# Patient Record
Sex: FEMALE | Race: BLACK OR AFRICAN AMERICAN | NOT HISPANIC OR LATINO | URBAN - METROPOLITAN AREA
[De-identification: names, ages, dates, MRNs, and addresses within clinical notes are randomized per-mention and may not be internally consistent; named-entity substitution may affect disease eponyms.]

---

## 2023-09-18 ENCOUNTER — INPATIENT (INPATIENT)
Facility: HOSPITAL | Age: 63
LOS: 4 days | Discharge: LEFT BEFORE DISCHARGE | DRG: 304 | End: 2023-09-23
Attending: INTERNAL MEDICINE | Admitting: INTERNAL MEDICINE
Payer: COMMERCIAL

## 2023-09-18 VITALS
DIASTOLIC BLOOD PRESSURE: 93 MMHG | WEIGHT: 110.01 LBS | OXYGEN SATURATION: 98 % | RESPIRATION RATE: 16 BRPM | TEMPERATURE: 98 F | HEART RATE: 80 BPM | SYSTOLIC BLOOD PRESSURE: 221 MMHG

## 2023-09-18 LAB
ALBUMIN SERPL ELPH-MCNC: 3.8 G/DL — SIGNIFICANT CHANGE UP (ref 3.4–5)
ALBUMIN SERPL ELPH-MCNC: 4.2 G/DL — SIGNIFICANT CHANGE UP (ref 3.3–5)
ALP SERPL-CCNC: 129 U/L — HIGH (ref 40–120)
ALP SERPL-CCNC: 136 U/L — HIGH (ref 40–120)
ALT FLD-CCNC: 18 U/L — SIGNIFICANT CHANGE UP (ref 10–45)
ALT FLD-CCNC: 26 U/L — SIGNIFICANT CHANGE UP (ref 12–42)
AMPHET UR-MCNC: NEGATIVE — SIGNIFICANT CHANGE UP
ANION GAP SERPL CALC-SCNC: 11 MMOL/L — SIGNIFICANT CHANGE UP (ref 9–16)
ANION GAP SERPL CALC-SCNC: 14 MMOL/L — SIGNIFICANT CHANGE UP (ref 5–17)
APPEARANCE CSF: CLEAR — SIGNIFICANT CHANGE UP
APPEARANCE SPUN FLD: COLORLESS — SIGNIFICANT CHANGE UP
APPEARANCE UR: CLEAR — SIGNIFICANT CHANGE UP
AST SERPL-CCNC: 22 U/L — SIGNIFICANT CHANGE UP (ref 10–40)
AST SERPL-CCNC: 27 U/L — SIGNIFICANT CHANGE UP (ref 15–37)
BACTERIA # UR AUTO: PRESENT /HPF
BARBITURATES UR SCN-MCNC: NEGATIVE — SIGNIFICANT CHANGE UP
BASOPHILS # BLD AUTO: 0.03 K/UL — SIGNIFICANT CHANGE UP (ref 0–0.2)
BASOPHILS # BLD AUTO: 0.04 K/UL — SIGNIFICANT CHANGE UP (ref 0–0.2)
BASOPHILS NFR BLD AUTO: 0.4 % — SIGNIFICANT CHANGE UP (ref 0–2)
BASOPHILS NFR BLD AUTO: 0.5 % — SIGNIFICANT CHANGE UP (ref 0–2)
BENZODIAZ UR-MCNC: NEGATIVE — SIGNIFICANT CHANGE UP
BILIRUB SERPL-MCNC: 0.6 MG/DL — SIGNIFICANT CHANGE UP (ref 0.2–1.2)
BILIRUB SERPL-MCNC: 0.7 MG/DL — SIGNIFICANT CHANGE UP (ref 0.2–1.2)
BILIRUB UR-MCNC: NEGATIVE — SIGNIFICANT CHANGE UP
BUN SERPL-MCNC: 9 MG/DL — SIGNIFICANT CHANGE UP (ref 7–23)
BUN SERPL-MCNC: 9 MG/DL — SIGNIFICANT CHANGE UP (ref 7–23)
CALCIUM SERPL-MCNC: 9.8 MG/DL — SIGNIFICANT CHANGE UP (ref 8.5–10.5)
CALCIUM SERPL-MCNC: 9.9 MG/DL — SIGNIFICANT CHANGE UP (ref 8.4–10.5)
CHLORIDE SERPL-SCNC: 96 MMOL/L — SIGNIFICANT CHANGE UP (ref 96–108)
CHLORIDE SERPL-SCNC: 96 MMOL/L — SIGNIFICANT CHANGE UP (ref 96–108)
CO2 SERPL-SCNC: 25 MMOL/L — SIGNIFICANT CHANGE UP (ref 22–31)
CO2 SERPL-SCNC: 29 MMOL/L — SIGNIFICANT CHANGE UP (ref 22–31)
COCAINE METAB.OTHER UR-MCNC: NEGATIVE — SIGNIFICANT CHANGE UP
COLOR CSF: SIGNIFICANT CHANGE UP
COLOR SPEC: YELLOW — SIGNIFICANT CHANGE UP
CREAT SERPL-MCNC: 0.53 MG/DL — SIGNIFICANT CHANGE UP (ref 0.5–1.3)
CREAT SERPL-MCNC: 0.69 MG/DL — SIGNIFICANT CHANGE UP (ref 0.5–1.3)
CSF COMMENTS: SIGNIFICANT CHANGE UP
DIFF PNL FLD: ABNORMAL
EGFR: 103 ML/MIN/1.73M2 — SIGNIFICANT CHANGE UP
EGFR: 97 ML/MIN/1.73M2 — SIGNIFICANT CHANGE UP
EOSINOPHIL # BLD AUTO: 0 K/UL — SIGNIFICANT CHANGE UP (ref 0–0.5)
EOSINOPHIL # BLD AUTO: 0.06 K/UL — SIGNIFICANT CHANGE UP (ref 0–0.5)
EOSINOPHIL NFR BLD AUTO: 0 % — SIGNIFICANT CHANGE UP (ref 0–6)
EOSINOPHIL NFR BLD AUTO: 0.8 % — SIGNIFICANT CHANGE UP (ref 0–6)
EPI CELLS # UR: ABNORMAL /HPF (ref 0–5)
GLUCOSE BLDC GLUCOMTR-MCNC: 116 MG/DL — HIGH (ref 70–99)
GLUCOSE CSF-MCNC: 75 MG/DL — HIGH (ref 40–70)
GLUCOSE SERPL-MCNC: 118 MG/DL — HIGH (ref 70–99)
GLUCOSE SERPL-MCNC: 128 MG/DL — HIGH (ref 70–99)
GLUCOSE UR QL: NEGATIVE — SIGNIFICANT CHANGE UP
GRAM STN FLD: SIGNIFICANT CHANGE UP
HCT VFR BLD CALC: 46.8 % — HIGH (ref 34.5–45)
HCT VFR BLD CALC: 47.1 % — HIGH (ref 34.5–45)
HGB BLD-MCNC: 15.1 G/DL — SIGNIFICANT CHANGE UP (ref 11.5–15.5)
HGB BLD-MCNC: 15.4 G/DL — SIGNIFICANT CHANGE UP (ref 11.5–15.5)
IMM GRANULOCYTES NFR BLD AUTO: 0.3 % — SIGNIFICANT CHANGE UP (ref 0–0.9)
IMM GRANULOCYTES NFR BLD AUTO: 0.4 % — SIGNIFICANT CHANGE UP (ref 0–0.9)
KETONES UR-MCNC: 15 MG/DL
LACTATE SERPL-SCNC: 1.5 MMOL/L — SIGNIFICANT CHANGE UP (ref 0.5–2)
LEUKOCYTE ESTERASE UR-ACNC: NEGATIVE — SIGNIFICANT CHANGE UP
LYMPHOCYTES # BLD AUTO: 1.16 K/UL — SIGNIFICANT CHANGE UP (ref 1–3.3)
LYMPHOCYTES # BLD AUTO: 1.37 K/UL — SIGNIFICANT CHANGE UP (ref 1–3.3)
LYMPHOCYTES # BLD AUTO: 15 % — SIGNIFICANT CHANGE UP (ref 13–44)
LYMPHOCYTES # BLD AUTO: 18.2 % — SIGNIFICANT CHANGE UP (ref 13–44)
LYMPHOCYTES # CSF: 1 % — LOW (ref 40–80)
MAGNESIUM SERPL-MCNC: 1.7 MG/DL — SIGNIFICANT CHANGE UP (ref 1.6–2.6)
MCHC RBC-ENTMCNC: 25.9 PG — LOW (ref 27–34)
MCHC RBC-ENTMCNC: 26.1 PG — LOW (ref 27–34)
MCHC RBC-ENTMCNC: 32.1 GM/DL — SIGNIFICANT CHANGE UP (ref 32–36)
MCHC RBC-ENTMCNC: 32.9 GM/DL — SIGNIFICANT CHANGE UP (ref 32–36)
MCV RBC AUTO: 79.2 FL — LOW (ref 80–100)
MCV RBC AUTO: 80.8 FL — SIGNIFICANT CHANGE UP (ref 80–100)
METHADONE UR-MCNC: NEGATIVE — SIGNIFICANT CHANGE UP
MONOCYTES # BLD AUTO: 0.44 K/UL — SIGNIFICANT CHANGE UP (ref 0–0.9)
MONOCYTES # BLD AUTO: 0.67 K/UL — SIGNIFICANT CHANGE UP (ref 0–0.9)
MONOCYTES NFR BLD AUTO: 5.7 % — SIGNIFICANT CHANGE UP (ref 2–14)
MONOCYTES NFR BLD AUTO: 8.9 % — SIGNIFICANT CHANGE UP (ref 2–14)
MONOS+MACROS NFR CSF: 1 % — LOW (ref 15–45)
NEUTROPHILS # BLD AUTO: 5.41 K/UL — SIGNIFICANT CHANGE UP (ref 1.8–7.4)
NEUTROPHILS # BLD AUTO: 6.01 K/UL — SIGNIFICANT CHANGE UP (ref 1.8–7.4)
NEUTROPHILS # CSF: 0 % — SIGNIFICANT CHANGE UP (ref 0–6)
NEUTROPHILS NFR BLD AUTO: 72.1 % — SIGNIFICANT CHANGE UP (ref 43–77)
NEUTROPHILS NFR BLD AUTO: 77.7 % — HIGH (ref 43–77)
NITRITE UR-MCNC: NEGATIVE — SIGNIFICANT CHANGE UP
NRBC # BLD: 0 /100 WBCS — SIGNIFICANT CHANGE UP (ref 0–0)
NRBC # BLD: 0 /100 WBCS — SIGNIFICANT CHANGE UP (ref 0–0)
NRBC NFR CSF: 2 /UL — SIGNIFICANT CHANGE UP (ref 0–5)
OPIATES UR-MCNC: NEGATIVE — SIGNIFICANT CHANGE UP
OSMOLALITY UR: 523 MOSM/KG — SIGNIFICANT CHANGE UP (ref 300–900)
PCP SPEC-MCNC: SIGNIFICANT CHANGE UP
PCP UR-MCNC: NEGATIVE — SIGNIFICANT CHANGE UP
PH UR: 6.5 — SIGNIFICANT CHANGE UP (ref 5–8)
PHOSPHATE SERPL-MCNC: 3 MG/DL — SIGNIFICANT CHANGE UP (ref 2.5–4.5)
PLATELET # BLD AUTO: 192 K/UL — SIGNIFICANT CHANGE UP (ref 150–400)
PLATELET # BLD AUTO: 212 K/UL — SIGNIFICANT CHANGE UP (ref 150–400)
POTASSIUM SERPL-MCNC: 3.5 MMOL/L — SIGNIFICANT CHANGE UP (ref 3.5–5.3)
POTASSIUM SERPL-MCNC: 3.7 MMOL/L — SIGNIFICANT CHANGE UP (ref 3.5–5.3)
POTASSIUM SERPL-SCNC: 3.5 MMOL/L — SIGNIFICANT CHANGE UP (ref 3.5–5.3)
POTASSIUM SERPL-SCNC: 3.7 MMOL/L — SIGNIFICANT CHANGE UP (ref 3.5–5.3)
PROCALCITONIN SERPL-MCNC: 0.06 NG/ML — SIGNIFICANT CHANGE UP (ref 0.02–0.1)
PROT CSF-MCNC: 79 MG/DL — HIGH (ref 15–45)
PROT SERPL-MCNC: 10.2 G/DL — HIGH (ref 6.4–8.2)
PROT SERPL-MCNC: 9.3 G/DL — HIGH (ref 6–8.3)
PROT UR-MCNC: >=300 MG/DL
RBC # BLD: 5.83 M/UL — HIGH (ref 3.8–5.2)
RBC # BLD: 5.91 M/UL — HIGH (ref 3.8–5.2)
RBC # CSF: 1 /UL — HIGH (ref 0–0)
RBC # FLD: 18.7 % — HIGH (ref 10.3–14.5)
RBC # FLD: 19 % — HIGH (ref 10.3–14.5)
RBC CASTS # UR COMP ASSIST: ABNORMAL /HPF
SODIUM SERPL-SCNC: 135 MMOL/L — SIGNIFICANT CHANGE UP (ref 135–145)
SODIUM SERPL-SCNC: 136 MMOL/L — SIGNIFICANT CHANGE UP (ref 132–145)
SODIUM UR-SCNC: 128 MMOL/L — SIGNIFICANT CHANGE UP
SP GR SPEC: 1.02 — SIGNIFICANT CHANGE UP (ref 1–1.03)
SPECIMEN SOURCE: SIGNIFICANT CHANGE UP
THC UR QL: NEGATIVE — SIGNIFICANT CHANGE UP
TROPONIN I, HIGH SENSITIVITY RESULT: 26.4 NG/L — SIGNIFICANT CHANGE UP
TUBE TYPE: SIGNIFICANT CHANGE UP
UROBILINOGEN FLD QL: 0.2 E.U./DL — SIGNIFICANT CHANGE UP
WBC # BLD: 7.51 K/UL — SIGNIFICANT CHANGE UP (ref 3.8–10.5)
WBC # BLD: 7.73 K/UL — SIGNIFICANT CHANGE UP (ref 3.8–10.5)
WBC # FLD AUTO: 7.51 K/UL — SIGNIFICANT CHANGE UP (ref 3.8–10.5)
WBC # FLD AUTO: 7.73 K/UL — SIGNIFICANT CHANGE UP (ref 3.8–10.5)
WBC UR QL: ABNORMAL /HPF

## 2023-09-18 PROCEDURE — 88305 TISSUE EXAM BY PATHOLOGIST: CPT | Mod: 26

## 2023-09-18 PROCEDURE — 88108 CYTOPATH CONCENTRATE TECH: CPT | Mod: 26

## 2023-09-18 PROCEDURE — 71045 X-RAY EXAM CHEST 1 VIEW: CPT | Mod: 26

## 2023-09-18 PROCEDURE — 31500 INSERT EMERGENCY AIRWAY: CPT | Mod: GC

## 2023-09-18 PROCEDURE — 62270 DX LMBR SPI PNXR: CPT | Mod: GC

## 2023-09-18 PROCEDURE — 99291 CRITICAL CARE FIRST HOUR: CPT | Mod: GC,25

## 2023-09-18 PROCEDURE — 70450 CT HEAD/BRAIN W/O DYE: CPT | Mod: 26

## 2023-09-18 PROCEDURE — 99291 CRITICAL CARE FIRST HOUR: CPT

## 2023-09-18 RX ORDER — DEXMEDETOMIDINE HYDROCHLORIDE IN 0.9% SODIUM CHLORIDE 4 UG/ML
0.5 INJECTION INTRAVENOUS
Qty: 400 | Refills: 0 | Status: DISCONTINUED | OUTPATIENT
Start: 2023-09-18 | End: 2023-09-19

## 2023-09-18 RX ORDER — VANCOMYCIN HCL 1 G
750 VIAL (EA) INTRAVENOUS EVERY 12 HOURS
Refills: 0 | Status: DISCONTINUED | OUTPATIENT
Start: 2023-09-18 | End: 2023-09-19

## 2023-09-18 RX ORDER — FENTANYL CITRATE 50 UG/ML
25 INJECTION INTRAVENOUS ONCE
Refills: 0 | Status: DISCONTINUED | OUTPATIENT
Start: 2023-09-18 | End: 2023-09-18

## 2023-09-18 RX ORDER — DEXMEDETOMIDINE HYDROCHLORIDE IN 0.9% SODIUM CHLORIDE 4 UG/ML
0.5 INJECTION INTRAVENOUS
Qty: 200 | Refills: 0 | Status: DISCONTINUED | OUTPATIENT
Start: 2023-09-18 | End: 2023-09-18

## 2023-09-18 RX ORDER — METOCLOPRAMIDE HCL 10 MG
5 TABLET ORAL ONCE
Refills: 0 | Status: COMPLETED | OUTPATIENT
Start: 2023-09-18 | End: 2023-09-18

## 2023-09-18 RX ORDER — ACETAMINOPHEN 500 MG
1000 TABLET ORAL ONCE
Refills: 0 | Status: COMPLETED | OUTPATIENT
Start: 2023-09-18 | End: 2023-09-18

## 2023-09-18 RX ORDER — PROPOFOL 10 MG/ML
50 INJECTION, EMULSION INTRAVENOUS ONCE
Refills: 0 | Status: DISCONTINUED | OUTPATIENT
Start: 2023-09-18 | End: 2023-09-18

## 2023-09-18 RX ORDER — FENTANYL CITRATE 50 UG/ML
0.5 INJECTION INTRAVENOUS
Qty: 5000 | Refills: 0 | Status: DISCONTINUED | OUTPATIENT
Start: 2023-09-18 | End: 2023-09-19

## 2023-09-18 RX ORDER — CEFTRIAXONE 500 MG/1
2000 INJECTION, POWDER, FOR SOLUTION INTRAMUSCULAR; INTRAVENOUS EVERY 12 HOURS
Refills: 0 | Status: DISCONTINUED | OUTPATIENT
Start: 2023-09-18 | End: 2023-09-22

## 2023-09-18 RX ORDER — NICARDIPINE HYDROCHLORIDE 30 MG/1
5 CAPSULE, EXTENDED RELEASE ORAL
Qty: 40 | Refills: 0 | Status: DISCONTINUED | OUTPATIENT
Start: 2023-09-18 | End: 2023-09-19

## 2023-09-18 RX ORDER — CHLORHEXIDINE GLUCONATE 213 G/1000ML
15 SOLUTION TOPICAL EVERY 12 HOURS
Refills: 0 | Status: DISCONTINUED | OUTPATIENT
Start: 2023-09-18 | End: 2023-09-19

## 2023-09-18 RX ORDER — THIAMINE MONONITRATE (VIT B1) 100 MG
500 TABLET ORAL EVERY 8 HOURS
Refills: 0 | Status: COMPLETED | OUTPATIENT
Start: 2023-09-18 | End: 2023-09-21

## 2023-09-18 RX ORDER — ACYCLOVIR SODIUM 500 MG
500 VIAL (EA) INTRAVENOUS ONCE
Refills: 0 | Status: DISCONTINUED | OUTPATIENT
Start: 2023-09-18 | End: 2023-09-18

## 2023-09-18 RX ORDER — NICARDIPINE HYDROCHLORIDE 30 MG/1
5 CAPSULE, EXTENDED RELEASE ORAL
Qty: 40 | Refills: 0 | Status: DISCONTINUED | OUTPATIENT
Start: 2023-09-18 | End: 2023-09-18

## 2023-09-18 RX ORDER — MIDAZOLAM HYDROCHLORIDE 1 MG/ML
0.02 INJECTION, SOLUTION INTRAMUSCULAR; INTRAVENOUS
Qty: 100 | Refills: 0 | Status: DISCONTINUED | OUTPATIENT
Start: 2023-09-18 | End: 2023-09-19

## 2023-09-18 RX ORDER — ACYCLOVIR SODIUM 500 MG
500 VIAL (EA) INTRAVENOUS EVERY 8 HOURS
Refills: 0 | Status: DISCONTINUED | OUTPATIENT
Start: 2023-09-18 | End: 2023-09-20

## 2023-09-18 RX ORDER — SODIUM CHLORIDE 9 MG/ML
1000 INJECTION, SOLUTION INTRAVENOUS
Refills: 0 | Status: DISCONTINUED | OUTPATIENT
Start: 2023-09-18 | End: 2023-09-19

## 2023-09-18 RX ORDER — ENOXAPARIN SODIUM 100 MG/ML
30 INJECTION SUBCUTANEOUS EVERY 24 HOURS
Refills: 0 | Status: DISCONTINUED | OUTPATIENT
Start: 2023-09-19 | End: 2023-09-23

## 2023-09-18 RX ORDER — DEXAMETHASONE 0.5 MG/5ML
7.5 ELIXIR ORAL EVERY 6 HOURS
Refills: 0 | Status: DISCONTINUED | OUTPATIENT
Start: 2023-09-18 | End: 2023-09-19

## 2023-09-18 RX ORDER — ETOMIDATE 2 MG/ML
20 INJECTION INTRAVENOUS ONCE
Refills: 0 | Status: COMPLETED | OUTPATIENT
Start: 2023-09-18 | End: 2023-09-18

## 2023-09-18 RX ORDER — MIDAZOLAM HYDROCHLORIDE 1 MG/ML
2 INJECTION, SOLUTION INTRAMUSCULAR; INTRAVENOUS ONCE
Refills: 0 | Status: DISCONTINUED | OUTPATIENT
Start: 2023-09-18 | End: 2023-09-18

## 2023-09-18 RX ORDER — NOREPINEPHRINE BITARTRATE/D5W 8 MG/250ML
0.05 PLASTIC BAG, INJECTION (ML) INTRAVENOUS
Qty: 8 | Refills: 0 | Status: DISCONTINUED | OUTPATIENT
Start: 2023-09-18 | End: 2023-09-19

## 2023-09-18 RX ORDER — AMPICILLIN TRIHYDRATE 250 MG
2 CAPSULE ORAL EVERY 4 HOURS
Refills: 0 | Status: DISCONTINUED | OUTPATIENT
Start: 2023-09-18 | End: 2023-09-19

## 2023-09-18 RX ORDER — ACYCLOVIR SODIUM 500 MG
VIAL (EA) INTRAVENOUS
Refills: 0 | Status: DISCONTINUED | OUTPATIENT
Start: 2023-09-18 | End: 2023-09-18

## 2023-09-18 RX ORDER — LABETALOL HCL 100 MG
10 TABLET ORAL ONCE
Refills: 0 | Status: COMPLETED | OUTPATIENT
Start: 2023-09-18 | End: 2023-09-18

## 2023-09-18 RX ADMIN — MIDAZOLAM HYDROCHLORIDE 1 MG/KG/HR: 1 INJECTION, SOLUTION INTRAMUSCULAR; INTRAVENOUS at 20:18

## 2023-09-18 RX ADMIN — MIDAZOLAM HYDROCHLORIDE 2 MILLIGRAM(S): 1 INJECTION, SOLUTION INTRAMUSCULAR; INTRAVENOUS at 20:00

## 2023-09-18 RX ADMIN — Medication 2 MILLIGRAM(S): at 20:19

## 2023-09-18 RX ADMIN — FENTANYL CITRATE 25 MICROGRAM(S): 50 INJECTION INTRAVENOUS at 21:00

## 2023-09-18 RX ADMIN — DEXMEDETOMIDINE HYDROCHLORIDE IN 0.9% SODIUM CHLORIDE 6.24 MICROGRAM(S)/KG/HR: 4 INJECTION INTRAVENOUS at 19:01

## 2023-09-18 RX ADMIN — Medication 400 MILLIGRAM(S): at 19:02

## 2023-09-18 RX ADMIN — NICARDIPINE HYDROCHLORIDE 25 MG/HR: 30 CAPSULE, EXTENDED RELEASE ORAL at 17:26

## 2023-09-18 RX ADMIN — Medication 102 MILLIGRAM(S): at 13:32

## 2023-09-18 RX ADMIN — Medication 10 MILLIGRAM(S): at 13:31

## 2023-09-18 RX ADMIN — FENTANYL CITRATE 25 MICROGRAM(S): 50 INJECTION INTRAVENOUS at 20:00

## 2023-09-18 RX ADMIN — ETOMIDATE 20 MILLIGRAM(S): 2 INJECTION INTRAVENOUS at 19:59

## 2023-09-18 RX ADMIN — Medication 1000 MILLIGRAM(S): at 20:47

## 2023-09-18 RX ADMIN — NICARDIPINE HYDROCHLORIDE 25 MG/HR: 30 CAPSULE, EXTENDED RELEASE ORAL at 13:53

## 2023-09-18 RX ADMIN — Medication 216 GRAM(S): at 23:51

## 2023-09-18 NOTE — AIRWAY PLACEMENT NOTE ADULT - POST AIRWAY PLACEMENT ASSESSMENT:
breath sounds bilateral/positive end tidal CO2 noted/chest excursion noted breath sounds bilateral/breath sounds equal/positive end tidal CO2 noted/CXR pending/chest excursion noted

## 2023-09-18 NOTE — H&P ADULT - NSHPPHYSICALEXAM_GEN_ALL_CORE
Pt refuses to participate or allow physicians to touch her  General: Agitated w/ no acute distress  Neuro: AOx2-3, able to converse with staff, but agitated and appears alter  Pulm: Saturating well on RA w/ no increased WOB  CVS:   MSK:

## 2023-09-18 NOTE — PROGRESS NOTE ADULT - ASSESSMENT
Neuro  #AMS 2/2 Hypertensive emergency vs Pres  -Refuses to participate in exam or POCUS  -See CVS  -Send UTox to assess for drug induced enephalopathy    #HA  - Trauma to the head 3 days prior w/o changes on CT imaging.  -Tx w/ Tylenol PRN    CVS  #Hypertensive emergency  Treated w/ 2 runs of Nicardipine drip 25mL/hr, 1 10mg Labetalol IVP, goal of 20% reduction  -MRI to r/o PRES - if confirmed will target lower BP goals.  -No EKG changes      Pulm  SHRADDHA    Renal  No MARLENE    Endo  -A1c and Lipid panel to evaluate chronicity of     Prophylactic  Lovenox

## 2023-09-18 NOTE — PROGRESS NOTE ADULT - SUBJECTIVE AND OBJECTIVE BOX
Patient reports gradual onset of headache that became severe within about an hour. Headache is right-sided, throbbing, 10/10.  Positive photophobia.  Patient patient reports that she tripped and hit the right side of her head on a doorknob 3 days ago.  No fever, neck stiffness, chest pain, shortness of breath, abdominal pain, nausea, vomiting, diarrhea, focal weakness, paresthesias.  No history of hypertension or headaches.  Does not take any medications. Attempted to contact brother in the chart but had no significant additional input other than noting that the two live together. Not aware of any PCP, any PMHx, any FMHx, or medications taken.    ED course:  T /106 HR 84 RR % RA  Labetalol 10mg IVP  Metoclopramide 5mg IVP  2 runs of Nicardipine 40mg @ 25mL/hr    PE  General: Agitated w/ no acute distress  Neuro: AOx2-3, able to converse with staff, but agitated and appears alter  Pulm: Saturating well on RA w/ no increased WOB  CVS:   MSK:      Pt refuses to participate in exam

## 2023-09-18 NOTE — ED ADULT TRIAGE NOTE - CHIEF COMPLAINT QUOTE
Pt c/o headache , lethargy and weakness , reports hitting head 2 days ago.  On arrival pt hypertensive  STROKE

## 2023-09-18 NOTE — ED ADULT NURSE NOTE - OBJECTIVE STATEMENT
pt presents as above, hit head on door knob, 2 days ago, hurt afterwards, no pmhx, no regular meds, lives with brother, today went to work and colleagues noticed pt had clothes on round the wrong way, o/a to er pt taken to ct scan immediately, agitated on ct bed but redirectable. when taken to room pt unable to stay still stating "my head hurts" pointing to right side of head, denies nausea or photophobia , P3earl , answering all questions appropriately, asked Dr Owens to review pt as she appears so uncomfortable pt presents as above, hit head on door knob, 2 days ago, in pain since, no pmhx, no regular meds, lives with brother, today went to work and colleagues noticed pt had clothes on round the wrong way round, o/a to er pt taken to ct scan immediately, agitated on ct bed but redirectable. when taken to room pt unable to stay still stating "my head hurts" pointing to right side of head, begging for help denies nausea or photophobia , P3earl , answering all questions appropriately, but unable to fully obey commands in order to perform initial NIH scale, pt writhing around in bed clutching head, right foreahead.   asked Dr Owens to review pt as she appears so uncomfortable

## 2023-09-18 NOTE — H&P ADULT - CRITICAL CARE ATTENDING COMMENT
Hypertensive emergency with encephalopathy and fevers. No clinical history available so will attempt to get collateral. Possibility of PRES as well but need to know further history before targeting a lower SBP. CTH negative. Goal SBP of 180s. Needs an LP to rule out encephalitis/meningitis. Cover empirically with meningeal antibiotics. Rest of plan as per above.

## 2023-09-18 NOTE — H&P ADULT - NSHPLABSRESULTS_GEN_ALL_CORE
.  LABS:                         15.4   7.51  )-----------( 192      ( 18 Sep 2023 18:14 )             46.8         135  |  96  |  9   ----------------------------<  118<H>  3.7   |  25  |  0.53    Ca    9.9      18 Sep 2023 18:14  Phos  3.0       Mg     1.7         TPro  9.3<H>  /  Alb  4.2  /  TBili  0.7  /  DBili  x   /  AST  22  /  ALT  18  /  AlkPhos  129<H>        Urinalysis Basic - ( 18 Sep 2023 18:32 )    Color: Yellow / Appearance: Clear / S.025 / pH: x  Gluc: x / Ketone: 15 mg/dL  / Bili: Negative / Urobili: 0.2 E.U./dL   Blood: x / Protein: >=300 mg/dL / Nitrite: NEGATIVE   Leuk Esterase: NEGATIVE / RBC: 5-10 /HPF / WBC 5-10 /HPF   Sq Epi: x / Non Sq Epi: x / Bacteria: Present /HPF            Lactate, Blood: 1.5 mmol/L ( @ 18:14)      RADIOLOGY, EKG & ADDITIONAL TESTS: Reviewed.

## 2023-09-18 NOTE — ED PROVIDER NOTE - PROGRESS NOTE DETAILS
No response to labetalol. Put on nicardipine gtt. Spoke with Dr. Angel, admission to MICU under Dr. Barnes.

## 2023-09-18 NOTE — ED ADULT NURSE NOTE - NS ED NURSE LEVEL OF CONSCIOUSNESS SPEECH
Speaking Coherently
Detail Level: Zone
Photo Preface (Leave Blank If You Do Not Want): Photographs were obtained today

## 2023-09-18 NOTE — CHART NOTE - NSCHARTNOTEFT_GEN_A_CORE
attempted LP given fevers, ams. Patient given precedex at 1.5 and ativan 4 mg but continued moving during procedure. Not safe to continue LP procedure given difficulty of patient not cooperating so will defer for now. attempted LP given fevers, ams. Patient given precedex at 1.5 and ativan 4 mg but continued moving during procedure. Not safe to continue LP procedure given difficulty of patient not cooperating. Discussed with the patients brother/emergency contact who was agreeable with elective intubation to allow for appropriate diagnostic procedures given critical nature of making diagnosis of meningitis and PRES with LP and MRI respectively

## 2023-09-18 NOTE — ED PROVIDER NOTE - OBJECTIVE STATEMENT
Patient reports gradual onset of headache that became severe within about an hour. Headache is right-sided, throbbing, 10/10.  Positive photophobia.  Patient patient reports that she tripped and hit the right side of her head on a doorknob 3 days ago.  No fever, neck stiffness, chest pain, shortness of breath, abdominal pain, nausea, vomiting, diarrhea, focal weakness, paresthesias.  No history of hypertension or headaches.  Does not take any medications.

## 2023-09-18 NOTE — H&P ADULT - HISTORY OF PRESENT ILLNESS
Patient reports gradual onset of headache that became severe within about an hour. Headache is right-sided, throbbing, 10/10.  Positive photophobia.  Patient patient reports that she tripped and hit the right side of her head on a doorknob 3 days ago.  No fever, neck stiffness, chest pain, shortness of breath, abdominal pain, nausea, vomiting, diarrhea, focal weakness, paresthesias.  No history of hypertension or headaches.  Does not take any medications. Attempted to contact brother in the chart but had no significant additional input other than noting that the two live together. Not aware of any PCP, any PMHx, any FMHx, or medications taken.    ED course:  T /106 HR 84 RR % RA  Labetalol 10mg IVP  Metoclopramide 5mg IVP  2 runs of Nicardipine 40mg @ 25mL/hr    PE  General: Agitated w/ no acute distress  Neuro: AOx2-3, able to converse with staff, but agitated and appears alter  Pulm: Saturating well on RA w/ no increased WOB  CVS: No obvious heave  MSK: No obvious pedal edema

## 2023-09-18 NOTE — PROCEDURE NOTE - NSSITEPREP_SKIN_A_CORE
chlorhexidine/povidone iodine (if allergic to chlorhexidine) chlorhexidine/povidone iodine (if allergic to chlorhexidine)/Adherence to aseptic technique: hand hygiene prior to donning barriers (gown, gloves), don cap and mask, sterile drape over patient

## 2023-09-18 NOTE — H&P ADULT - ASSESSMENT
62F presents w/ AMS, elevated /106 w/ HA, and blurry vision likely 2/2 to HTN emergency currently improving on Nicardipine drip but continues to be altered.    Neuro  #AMS 2/2 Hypertensive emergency vs Pres  -Refuses to participate in exam or POCUS  -See CVS  -Send UTox to assess for drug induced enephalopathy    #HA  - Trauma to the head 3 days prior w/o changes on CT imaging.  -Tx w/ Tylenol PRN    CVS  #Hypertensive emergency  Treated w/ 2 runs of Nicardipine drip 25mL/hr, 1 10mg Labetalol IVP, goal of 20% reduction  -MRI to r/o PRES - if confirmed will target lower BP goals.  -No EKG changes      Pulm  SHRADDHA    Renal  No MARLENE    Endo  -A1c and Lipid panel to evaluate chronicity of     Prophylactic  Lovenox      62F presents w/ AMS, elevated /106 w/ HA, and blurry vision likely 2/2 to HTN emergency currently improving on Nicardipine drip but continues to be altered.    Neuro  #AMS 2/2 Hypertensive emergency vs Pres  -Refuses to participate in exam or POCUS  -See CVS  -Send UTox to assess for drug induced encephalopathy  -Sedation and intubation with plan for lumbar puncture with empiric coverage (vanc, ceftriaxone, ampicillin, acyclovir)    #HA  - Trauma to the head 3 days prior w/o changes on CT imaging.  -Tx w/ Tylenol PRN  - Repeat CT head with acute change in mental status    CVS  #Hypertensive emergency  Treated w/ 2 runs of Nicardipine drip 25mL/hr, 1 10mg Labetalol IVP, goal of 20% reduction  -MRI to r/o PRES - if confirmed will target lower BP goals.  -No EKG changes    Pulm  SHRADDHA    Renal  No MARLENE    Endo  -A1c and Lipid panel to evaluate chronicity of       Prophylactic  Fluids: None  Electrolytes: Replete to keep K>4 and Mg>2  Nutrition: NPO  DVT ppx: Lovenox 40mg daily  Dispo: RMF 62F presents w/ AMS, elevated /106 w/ HA, and blurry vision likely 2/2 to HTN emergency currently improving on Nicardipine drip but continues to be altered.    Neuro  #AMS 2/2 Hypertensive emergency vs Pres  -Refuses to participate in exam or POCUS  -See CVS  -Send UTox to assess for drug induced encephalopathy  -Sedation and intubation with plan for lumbar puncture with empiric coverage (vanc, ceftriaxone, ampicillin, acyclovir)    #HA  - Trauma to the head 3 days prior w/o changes on CT imaging.  -Tx w/ Tylenol PRN  - Repeat CT head with acute change in mental status    CVS  #Hypertensive emergency  Treated w/ 2 runs of Nicardipine drip 25mL/hr, 1 10mg Labetalol IVP, goal of 20% reduction. Proteinuria in UA  -MRI to r/o PRES - if confirmed will target lower BP goals.  -No EKG changes  - titrate nicardipine gtt    Pulm  Intubated and sedated  - follow up CXR for ET tube placement  - SAT/SBT    Renal  No MARLENE    ID  #Sepsis  2/4 SIRS criteria (febrile, tachycardic) with possible meningitis source  - follow up CXR, UA  - Plan for LP, will give vanco, ceftriaxaone, ampicillin, and acyclovir  - LR @ 80cc/hr while receiving acylocvir  - f/u blood/urine and CSF cultures  - Tylenol PRN    Heme  SHRADDHA    Endo  -A1c and Lipid panel to evaluate chronicity of hypertension    Prophylactic  Fluids: None  Electrolytes: Replete to keep K>4 and Mg>2  Nutrition: NPO  DVT ppx: Lovenox 40mg daily  Dispo: RMF 62F presents w/ AMS, elevated /106 w/ HA, and blurry vision likely 2/2 to HTN emergency currently improving on Nicardipine drip but continues to be altered.    Neuro  #AMS 2/2 Hypertensive emergency vs Pres  -Refuses to participate in exam or POCUS  -See CVS  -Send UTox to assess for drug induced encephalopathy  -Sedation and intubation with plan for lumbar puncture with empiric coverage (vanc, ceftriaxone, ampicillin, acyclovir)    #HA  - Trauma to the head 3 days prior w/o changes on CT imaging.  -Tx w/ Tylenol PRN  - Repeat CT head with acute change in mental status    CVS  #Hypertensive emergency  Treated w/ 2 runs of Nicardipine drip 25mL/hr, 1 10mg Labetalol IVP, goal of 20% reduction. Proteinuria in UA  -MRI to r/o PRES - if confirmed will target lower BP goals.  -No EKG changes  - titrate nicardipine gtt    Pulm  Intubated and sedated  - follow up CXR for ET tube placement  - SAT/SBT    Renal  No MARLENE    ID  #Sepsis  2/4 SIRS criteria (febrile, tachycardic) with possible meningitis source  - follow up CXR, UA  - Plan for LP, will give vanco 750mg q8h, ceftriaxaone 2g q12h, ampicillin 2g q4h, and acyclovir 500mg q8h  - LR @ 80cc/hr while receiving acylocvir  - f/u blood/urine and CSF cultures  - Tylenol PRN    Heme  SHRADDHA    Endo  -A1c and Lipid panel to evaluate chronicity of hypertension    Prophylactic  Fluids: None  Electrolytes: Replete to keep K>4 and Mg>2  Nutrition: NPO  DVT ppx: Lovenox 40mg daily  Dispo: RMF

## 2023-09-19 LAB
ALBUMIN SERPL ELPH-MCNC: 3.3 G/DL — SIGNIFICANT CHANGE UP (ref 3.3–5)
ALP SERPL-CCNC: 101 U/L — SIGNIFICANT CHANGE UP (ref 40–120)
ALT FLD-CCNC: 13 U/L — SIGNIFICANT CHANGE UP (ref 10–45)
AMMONIA BLD-MCNC: 25 UMOL/L — SIGNIFICANT CHANGE UP (ref 11–55)
ANION GAP SERPL CALC-SCNC: 12 MMOL/L — SIGNIFICANT CHANGE UP (ref 5–17)
AST SERPL-CCNC: 18 U/L — SIGNIFICANT CHANGE UP (ref 10–40)
BASOPHILS # BLD AUTO: 0.05 K/UL — SIGNIFICANT CHANGE UP (ref 0–0.2)
BASOPHILS NFR BLD AUTO: 0.8 % — SIGNIFICANT CHANGE UP (ref 0–2)
BILIRUB SERPL-MCNC: 0.7 MG/DL — SIGNIFICANT CHANGE UP (ref 0.2–1.2)
BUN SERPL-MCNC: 8 MG/DL — SIGNIFICANT CHANGE UP (ref 7–23)
CALCIUM SERPL-MCNC: 8.5 MG/DL — SIGNIFICANT CHANGE UP (ref 8.4–10.5)
CHLORIDE SERPL-SCNC: 97 MMOL/L — SIGNIFICANT CHANGE UP (ref 96–108)
CHOLEST SERPL-MCNC: 210 MG/DL — HIGH
CO2 SERPL-SCNC: 25 MMOL/L — SIGNIFICANT CHANGE UP (ref 22–31)
CREAT SERPL-MCNC: 0.81 MG/DL — SIGNIFICANT CHANGE UP (ref 0.5–1.3)
CSF PCR RESULT: SIGNIFICANT CHANGE UP
EGFR: 81 ML/MIN/1.73M2 — SIGNIFICANT CHANGE UP
EOSINOPHIL # BLD AUTO: 0.02 K/UL — SIGNIFICANT CHANGE UP (ref 0–0.5)
EOSINOPHIL NFR BLD AUTO: 0.3 % — SIGNIFICANT CHANGE UP (ref 0–6)
GLUCOSE SERPL-MCNC: 127 MG/DL — HIGH (ref 70–99)
HCT VFR BLD CALC: 39.7 % — SIGNIFICANT CHANGE UP (ref 34.5–45)
HCT VFR BLD CALC: 42.9 % — SIGNIFICANT CHANGE UP (ref 34.5–45)
HCV AB S/CO SERPL IA: 0.04 S/CO — SIGNIFICANT CHANGE UP
HCV AB SERPL-IMP: SIGNIFICANT CHANGE UP
HDLC SERPL-MCNC: 92 MG/DL — SIGNIFICANT CHANGE UP
HGB BLD-MCNC: 12.8 G/DL — SIGNIFICANT CHANGE UP (ref 11.5–15.5)
HGB BLD-MCNC: 13.9 G/DL — SIGNIFICANT CHANGE UP (ref 11.5–15.5)
IMM GRANULOCYTES NFR BLD AUTO: 0.2 % — SIGNIFICANT CHANGE UP (ref 0–0.9)
LACTATE SERPL-SCNC: 1.1 MMOL/L — SIGNIFICANT CHANGE UP (ref 0.5–2)
LIPID PNL WITH DIRECT LDL SERPL: 88 MG/DL — SIGNIFICANT CHANGE UP
LYMPHOCYTES # BLD AUTO: 2.02 K/UL — SIGNIFICANT CHANGE UP (ref 1–3.3)
LYMPHOCYTES # BLD AUTO: 32.3 % — SIGNIFICANT CHANGE UP (ref 13–44)
MAGNESIUM SERPL-MCNC: 1.5 MG/DL — LOW (ref 1.6–2.6)
MCHC RBC-ENTMCNC: 25.8 PG — LOW (ref 27–34)
MCHC RBC-ENTMCNC: 25.9 PG — LOW (ref 27–34)
MCHC RBC-ENTMCNC: 32.2 GM/DL — SIGNIFICANT CHANGE UP (ref 32–36)
MCHC RBC-ENTMCNC: 32.4 GM/DL — SIGNIFICANT CHANGE UP (ref 32–36)
MCV RBC AUTO: 79.9 FL — LOW (ref 80–100)
MCV RBC AUTO: 79.9 FL — LOW (ref 80–100)
MONOCYTES # BLD AUTO: 0.83 K/UL — SIGNIFICANT CHANGE UP (ref 0–0.9)
MONOCYTES NFR BLD AUTO: 13.3 % — SIGNIFICANT CHANGE UP (ref 2–14)
NEUTROPHILS # BLD AUTO: 3.32 K/UL — SIGNIFICANT CHANGE UP (ref 1.8–7.4)
NEUTROPHILS NFR BLD AUTO: 53.1 % — SIGNIFICANT CHANGE UP (ref 43–77)
NIGHT BLUE STAIN TISS: SIGNIFICANT CHANGE UP
NON HDL CHOLESTEROL: 118 MG/DL — SIGNIFICANT CHANGE UP
NRBC # BLD: 0 /100 WBCS — SIGNIFICANT CHANGE UP (ref 0–0)
NRBC # BLD: 0 /100 WBCS — SIGNIFICANT CHANGE UP (ref 0–0)
PHOSPHATE SERPL-MCNC: 3.6 MG/DL — SIGNIFICANT CHANGE UP (ref 2.5–4.5)
PLATELET # BLD AUTO: 190 K/UL — SIGNIFICANT CHANGE UP (ref 150–400)
PLATELET # BLD AUTO: 195 K/UL — SIGNIFICANT CHANGE UP (ref 150–400)
POTASSIUM SERPL-MCNC: 3.1 MMOL/L — LOW (ref 3.5–5.3)
POTASSIUM SERPL-SCNC: 3.1 MMOL/L — LOW (ref 3.5–5.3)
PROT SERPL-MCNC: 7.6 G/DL — SIGNIFICANT CHANGE UP (ref 6–8.3)
RBC # BLD: 4.97 M/UL — SIGNIFICANT CHANGE UP (ref 3.8–5.2)
RBC # BLD: 5.37 M/UL — HIGH (ref 3.8–5.2)
RBC # FLD: 17.7 % — HIGH (ref 10.3–14.5)
RBC # FLD: 18 % — HIGH (ref 10.3–14.5)
SARS-COV-2 RNA SPEC QL NAA+PROBE: SIGNIFICANT CHANGE UP
SODIUM SERPL-SCNC: 134 MMOL/L — LOW (ref 135–145)
SPECIMEN SOURCE: SIGNIFICANT CHANGE UP
TRIGL SERPL-MCNC: 150 MG/DL — HIGH
VANCOMYCIN TROUGH SERPL-MCNC: 5.7 UG/ML — LOW (ref 10–20)
WBC # BLD: 6.25 K/UL — SIGNIFICANT CHANGE UP (ref 3.8–10.5)
WBC # BLD: 7.68 K/UL — SIGNIFICANT CHANGE UP (ref 3.8–10.5)
WBC # FLD AUTO: 6.25 K/UL — SIGNIFICANT CHANGE UP (ref 3.8–10.5)
WBC # FLD AUTO: 7.68 K/UL — SIGNIFICANT CHANGE UP (ref 3.8–10.5)

## 2023-09-19 PROCEDURE — 99254 IP/OBS CNSLTJ NEW/EST MOD 60: CPT

## 2023-09-19 PROCEDURE — 36000 PLACE NEEDLE IN VEIN: CPT

## 2023-09-19 PROCEDURE — 76937 US GUIDE VASCULAR ACCESS: CPT | Mod: 26

## 2023-09-19 PROCEDURE — 99291 CRITICAL CARE FIRST HOUR: CPT

## 2023-09-19 RX ORDER — PROPOFOL 10 MG/ML
25.02 INJECTION, EMULSION INTRAVENOUS
Qty: 1000 | Refills: 0 | Status: DISCONTINUED | OUTPATIENT
Start: 2023-09-19 | End: 2023-09-20

## 2023-09-19 RX ORDER — NOREPINEPHRINE BITARTRATE/D5W 8 MG/250ML
0.05 PLASTIC BAG, INJECTION (ML) INTRAVENOUS
Qty: 8 | Refills: 0 | Status: DISCONTINUED | OUTPATIENT
Start: 2023-09-19 | End: 2023-09-20

## 2023-09-19 RX ORDER — FENTANYL CITRATE 50 UG/ML
2 INJECTION INTRAVENOUS
Qty: 2500 | Refills: 0 | Status: DISCONTINUED | OUTPATIENT
Start: 2023-09-19 | End: 2023-09-19

## 2023-09-19 RX ORDER — CHLORHEXIDINE GLUCONATE 213 G/1000ML
1 SOLUTION TOPICAL
Refills: 0 | Status: DISCONTINUED | OUTPATIENT
Start: 2023-09-19 | End: 2023-09-22

## 2023-09-19 RX ORDER — AMPICILLIN TRIHYDRATE 250 MG
2 CAPSULE ORAL EVERY 4 HOURS
Refills: 0 | Status: DISCONTINUED | OUTPATIENT
Start: 2023-09-19 | End: 2023-09-20

## 2023-09-19 RX ORDER — FENTANYL CITRATE 50 UG/ML
2 INJECTION INTRAVENOUS
Qty: 2500 | Refills: 0 | Status: DISCONTINUED | OUTPATIENT
Start: 2023-09-19 | End: 2023-09-20

## 2023-09-19 RX ORDER — CHLORHEXIDINE GLUCONATE 213 G/1000ML
15 SOLUTION TOPICAL EVERY 12 HOURS
Refills: 0 | Status: DISCONTINUED | OUTPATIENT
Start: 2023-09-19 | End: 2023-09-20

## 2023-09-19 RX ORDER — POTASSIUM CHLORIDE 20 MEQ
10 PACKET (EA) ORAL
Refills: 0 | Status: COMPLETED | OUTPATIENT
Start: 2023-09-19 | End: 2023-09-19

## 2023-09-19 RX ORDER — PANTOPRAZOLE SODIUM 20 MG/1
40 TABLET, DELAYED RELEASE ORAL DAILY
Refills: 0 | Status: DISCONTINUED | OUTPATIENT
Start: 2023-09-19 | End: 2023-09-22

## 2023-09-19 RX ORDER — SODIUM CHLORIDE 9 MG/ML
1000 INJECTION, SOLUTION INTRAVENOUS
Refills: 0 | Status: DISCONTINUED | OUTPATIENT
Start: 2023-09-19 | End: 2023-09-20

## 2023-09-19 RX ORDER — VANCOMYCIN HCL 1 G
750 VIAL (EA) INTRAVENOUS EVERY 12 HOURS
Refills: 0 | Status: DISCONTINUED | OUTPATIENT
Start: 2023-09-19 | End: 2023-09-20

## 2023-09-19 RX ORDER — NOREPINEPHRINE BITARTRATE/D5W 8 MG/250ML
0.05 PLASTIC BAG, INJECTION (ML) INTRAVENOUS
Qty: 8 | Refills: 0 | Status: DISCONTINUED | OUTPATIENT
Start: 2023-09-19 | End: 2023-09-19

## 2023-09-19 RX ORDER — PROPOFOL 10 MG/ML
25 INJECTION, EMULSION INTRAVENOUS
Qty: 1000 | Refills: 0 | Status: DISCONTINUED | OUTPATIENT
Start: 2023-09-19 | End: 2023-09-19

## 2023-09-19 RX ADMIN — ENOXAPARIN SODIUM 30 MILLIGRAM(S): 100 INJECTION SUBCUTANEOUS at 07:26

## 2023-09-19 RX ADMIN — Medication 100 MILLIEQUIVALENT(S): at 11:35

## 2023-09-19 RX ADMIN — Medication 100 MILLIGRAM(S): at 16:11

## 2023-09-19 RX ADMIN — Medication 216 GRAM(S): at 12:01

## 2023-09-19 RX ADMIN — Medication 105 MILLIGRAM(S): at 18:43

## 2023-09-19 RX ADMIN — Medication 100 MILLIEQUIVALENT(S): at 12:44

## 2023-09-19 RX ADMIN — FENTANYL CITRATE 9.98 MICROGRAM(S)/KG/HR: 50 INJECTION INTRAVENOUS at 10:30

## 2023-09-19 RX ADMIN — MIDAZOLAM HYDROCHLORIDE 1 MG/KG/HR: 1 INJECTION, SOLUTION INTRAMUSCULAR; INTRAVENOUS at 07:20

## 2023-09-19 RX ADMIN — Medication 105 MILLIGRAM(S): at 04:00

## 2023-09-19 RX ADMIN — Medication 100 MILLIGRAM(S): at 00:34

## 2023-09-19 RX ADMIN — Medication 216 GRAM(S): at 20:00

## 2023-09-19 RX ADMIN — Medication 105 MILLIGRAM(S): at 09:34

## 2023-09-19 RX ADMIN — Medication 216 GRAM(S): at 03:32

## 2023-09-19 RX ADMIN — Medication 101.5 MILLIGRAM(S): at 03:57

## 2023-09-19 RX ADMIN — Medication 101.5 MILLIGRAM(S): at 11:13

## 2023-09-19 RX ADMIN — Medication 100 MILLIGRAM(S): at 07:40

## 2023-09-19 RX ADMIN — CEFTRIAXONE 100 MILLIGRAM(S): 500 INJECTION, POWDER, FOR SOLUTION INTRAMUSCULAR; INTRAVENOUS at 06:49

## 2023-09-19 RX ADMIN — PANTOPRAZOLE SODIUM 40 MILLIGRAM(S): 20 TABLET, DELAYED RELEASE ORAL at 12:44

## 2023-09-19 RX ADMIN — Medication 216 GRAM(S): at 07:00

## 2023-09-19 RX ADMIN — SODIUM CHLORIDE 80 MILLILITER(S): 9 INJECTION, SOLUTION INTRAVENOUS at 15:59

## 2023-09-19 RX ADMIN — CHLORHEXIDINE GLUCONATE 15 MILLILITER(S): 213 SOLUTION TOPICAL at 18:43

## 2023-09-19 RX ADMIN — Medication 100 MILLIEQUIVALENT(S): at 07:22

## 2023-09-19 RX ADMIN — PROPOFOL 7.49 MICROGRAM(S)/KG/MIN: 10 INJECTION, EMULSION INTRAVENOUS at 11:12

## 2023-09-19 RX ADMIN — Medication 216 GRAM(S): at 15:55

## 2023-09-19 RX ADMIN — CEFTRIAXONE 100 MILLIGRAM(S): 500 INJECTION, POWDER, FOR SOLUTION INTRAMUSCULAR; INTRAVENOUS at 18:43

## 2023-09-19 RX ADMIN — SODIUM CHLORIDE 80 MILLILITER(S): 9 INJECTION, SOLUTION INTRAVENOUS at 02:32

## 2023-09-19 RX ADMIN — Medication 250 MILLIGRAM(S): at 06:49

## 2023-09-19 NOTE — CONSULT NOTE ADULT - ATTENDING COMMENTS
admitted with ams and rigidity, intubated for airway protection, pending MRI and EEG. CSF shows elevated protein no pleocytosis and normal glucose, arguing against bacterial or fungal meningitis, possibly viral. CSF PCR meningoencephalitis panel negative, cultures negative, utox neg, previously on broad spectrum abx, now on acyclovir. Will review MRI when available.

## 2023-09-19 NOTE — CONSULT NOTE ADULT - SUBJECTIVE AND OBJECTIVE BOX
HPI:  Patient reports gradual onset of headache that became severe within about an hour. Headache is right-sided, throbbing, 10/10.  Positive photophobia.  Patient patient reports that she tripped and hit the right side of her head on a doorknob 3 days ago.  No fever, neck stiffness, chest pain, shortness of breath, abdominal pain, nausea, vomiting, diarrhea, focal weakness, paresthesias.  No history of hypertension or headaches.  Does not take any medications. Attempted to contact brother in the chart but had no significant additional input other than noting that the two live together. Not aware of any PCP, any PMHx, any FMHx, or medications taken.  ED course:  T /106 HR 84 RR % RA  Labetalol 10mg IVP  Metoclopramide 5mg IVP  2 runs of Nicardipine 40mg @ 25mL/hr  PE  General: Agitated w/ no acute distress  Neuro: AOx2-3, able to converse with staff, but agitated and appears alter  Pulm: Saturating well on RA w/ no increased WOB  CVS: No obvious heave  MSK: No obvious pedal edema (18 Sep 2023 19:21)      Unable to obtain any history as patient is intubated, sedated      PAST MEDICAL & SURGICAL HISTORY:  No pertinent past medical history            Medications:  acyclovir IVPB 500 milliGRAM(s) IV Intermittent every 8 hours  ampicillin  IVPB 2 Gram(s) IV Intermittent every 4 hours  cefTRIAXone   IVPB 2000 milliGRAM(s) IV Intermittent every 12 hours  chlorhexidine 0.12% Liquid 15 milliLiter(s) Oral Mucosa every 12 hours  chlorhexidine 2% Cloths 1 Application(s) Topical <User Schedule>  dexAMETHasone  IVPB 7.5 milliGRAM(s) IV Intermittent every 6 hours  enoxaparin Injectable 30 milliGRAM(s) SubCutaneous every 24 hours  fentaNYL   Infusion. 2 MICROgram(s)/kG/Hr IV Continuous <Continuous>  lactated ringers. 1000 milliLiter(s) IV Continuous <Continuous>  norepinephrine Infusion 0.05 MICROgram(s)/kG/Min IV Continuous <Continuous>  pantoprazole  Injectable 40 milliGRAM(s) IV Push daily  propofol Infusion 25 MICROgram(s)/kG/Min IV Continuous <Continuous>  thiamine IVPB 500 milliGRAM(s) IV Intermittent every 8 hours  vancomycin  IVPB 750 milliGRAM(s) IV Intermittent every 12 hours      Vital Signs Last 24 Hrs  T(C): 37.1 (19 Sep 2023 13:48), Max: 38.7 (18 Sep 2023 22:11)  T(F): 98.8 (19 Sep 2023 13:48), Max: 101.7 (18 Sep 2023 22:11)  HR: 69 (19 Sep 2023 16:00) (69 - 129)  BP: 161/81 (19 Sep 2023 16:00) (96/54 - 208/103)  BP(mean): 113 (19 Sep 2023 16:00) (67 - 157)  RR: 14 (19 Sep 2023 16:00) (14 - 35)  SpO2: 100% (19 Sep 2023 16:00) (96% - 100%)    Parameters below as of 19 Sep 2023 16:00  Patient On (Oxygen Delivery Method): ventilator    O2 Concentration (%): 30    Neurological Exam: on propofol and fentanyl  Neurologic:  Mental status:  does not open eyes to voice or noxious stimuli, does not follow commands or track, mute  Cranial nerves:   - II: Blink to threat intact   - III, IV, VI: Oculocephalic reflex intact. Pupils equally round and reactive to light  - V:  Corneal reflex intact  - VII: Face appears symmetrict  Motor/Sensation: Increased tone in all 4 extremities, Withdraws bilateral arms and legs to noxious stimuli.   Reflexes: Downgoing toes bilaterally     Labs:    Fingerstick Blood Glucose: CAPILLARY BLOOD GLUCOSE      POCT Blood Glucose.: 116 mg/dL (18 Sep 2023 17:14)    LABS:                        13.9   7.68  )-----------( 190      ( 19 Sep 2023 16:20 )             42.9     09-19    134<L>  |  97  |  8   ----------------------------<  127<H>  3.1<L>   |  25  |  0.81    Ca    8.5      19 Sep 2023 06:01  Phos  3.6     09-19  Mg     1.5     09-19    TPro  7.6  /  Alb  3.3  /  TBili  0.7  /  DBili  x   /  AST  18  /  ALT  13  /  AlkPhos  101  09-19          Urinalysis Basic - ( 19 Sep 2023 06:01 )    Color: x / Appearance: x / SG: x / pH: x  Gluc: 127 mg/dL / Ketone: x  / Bili: x / Urobili: x   Blood: x / Protein: x / Nitrite: x   Leuk Esterase: x / RBC: x / WBC x   Sq Epi: x / Non Sq Epi: x / Bacteria: x      < from: CT Head No Cont (09.18.23 @ 13:16) >  Mild to moderate chronic microvascular changes without   evidence of an acute transcortical infarction or hemorrhage.              
HPI:  Patient reports gradual onset of headache that became severe within about an hour. Headache is right-sided, throbbing, 10/10.  Positive photophobia.  Patient patient reports that she tripped and hit the right side of her head on a doorknob 3 days ago.  No fever, neck stiffness, chest pain, shortness of breath, abdominal pain, nausea, vomiting, diarrhea, focal weakness, paresthesias.  No history of hypertension or headaches.  Does not take any medications. Attempted to contact brother in the chart but had no significant additional input other than noting that the two live together. Not aware of any PCP, any PMHx, any FMHx, or medications taken.    ED course:  T /106 HR 84 RR % RA  Labetalol 10mg IVP  Metoclopramide 5mg IVP  2 runs of Nicardipine 40mg @ 25mL/hr    She is s/p LP    PAST MEDICAL & SURGICAL HISTORY:  No pertinent past medical history            REVIEW OF SYSTEMS:    unable to obtain       ANTIBIOTICS:  MEDICATIONS  (STANDING):  acyclovir IVPB 500 milliGRAM(s) IV Intermittent every 8 hours  cefTRIAXone   IVPB 2000 milliGRAM(s) IV Intermittent every 12 hours  chlorhexidine 0.12% Liquid 15 milliLiter(s) Oral Mucosa every 12 hours  chlorhexidine 2% Cloths 1 Application(s) Topical <User Schedule>  enoxaparin Injectable 30 milliGRAM(s) SubCutaneous every 24 hours  fentaNYL   Infusion. 2 MICROgram(s)/kG/Hr (9.98 mL/Hr) IV Continuous <Continuous>  lactated ringers. 1000 milliLiter(s) (80 mL/Hr) IV Continuous <Continuous>  norepinephrine Infusion 0.05 MICROgram(s)/kG/Min (4.68 mL/Hr) IV Continuous <Continuous>  pantoprazole  Injectable 40 milliGRAM(s) IV Push daily  propofol Infusion 25 MICROgram(s)/kG/Min (7.49 mL/Hr) IV Continuous <Continuous>  thiamine IVPB 500 milliGRAM(s) IV Intermittent every 8 hours    MEDICATIONS  (PRN):      Allergies    No Known Allergies    Intolerances        SOCIAL HISTORY:    FAMILY HISTORY:      Vital Signs Last 24 Hrs  T(C): 37.1 (19 Sep 2023 13:48), Max: 38.7 (18 Sep 2023 22:11)  T(F): 98.8 (19 Sep 2023 13:48), Max: 101.7 (18 Sep 2023 22:11)  HR: 69 (19 Sep 2023 16:00) (69 - 129)  BP: 161/81 (19 Sep 2023 16:00) (96/54 - 208/103)  BP(mean): 113 (19 Sep 2023 16:00) (67 - 146)  RR: 14 (19 Sep 2023 16:00) (14 - 35)  SpO2: 100% (19 Sep 2023 16:00) (96% - 100%)    Parameters below as of 19 Sep 2023 16:00  Patient On (Oxygen Delivery Method): ventilator    O2 Concentration (%): 30    PHYSICAL EXAM:  Constitutional:  intubated, ill appearing  Eyes:HOANG, EOMI  Ear/Nose/Throat: no oral lesion, no sinus tenderness on percussion	  Neck:   supple  Respiratory: CTA gala  Cardiovascular: S1S2 RRR, no murmurs  Gastrointestinal:soft, (+) BS, no HSM  Extremities:no e/e/c  Vascular: DP Pulse:	right normal; left normal            LABS:                        13.9   7.68  )-----------( 190      ( 19 Sep 2023 16:20 )             42.9     09-19    134<L>  |  97  |  8   ----------------------------<  127<H>  3.1<L>   |  25  |  0.81    Ca    8.5      19 Sep 2023 06:01  Phos  3.6     09-19  Mg     1.5     09-19    TPro  7.6  /  Alb  3.3  /  TBili  0.7  /  DBili  x   /  AST  18  /  ALT  13  /  AlkPhos  101  09-19      Urinalysis Basic - ( 19 Sep 2023 06:01 )    Color: x / Appearance: x / SG: x / pH: x  Gluc: 127 mg/dL / Ketone: x  / Bili: x / Urobili: x   Blood: x / Protein: x / Nitrite: x   Leuk Esterase: x / RBC: x / WBC x   Sq Epi: x / Non Sq Epi: x / Bacteria: x    Cerebrospinal Fluid Cell Count-1 (09.18.23 @ 22:40)    Tube Type: Tube 3   CSF Appearance: Clear   CSF Comments: manual diff is based on wbc counted   CSF Lymphocytes: 1 %   CSF Monocytes/Macrophages: 1 %   CSF Neutrophils: 0 %   Appearance Spun: Colorless   Total Nucleated Cell Count, CSF: 2 /uL   RBC Count - Spinal Fluid: 1 /uL   CSF Color: No Color          MICROBIOLOGY:    Culture - Blood (09.19.23 @ 02:42)    Specimen Source: .Blood Blood   Culture Results:   No growth at 12 hours      Culture - CSF with Gram Stain (09.18.23 @ 22:40)    Gram Stain:   No organisms seen  Rare WBC's   Specimen Source: .CSF csf fluid   Culture Results:   No growth to date    RADIOLOGY & ADDITIONAL STUDIES:    < from: CT Head No Cont (09.18.23 @ 13:16) >    IMPRESSION:  Mild to moderate chronic microvascular changes without   evidence of an acute transcortical infarction or hemorrhage.    < end of copied text >

## 2023-09-19 NOTE — CONSULT NOTE ADULT - ASSESSMENT
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  Would favor a viral etiology given normal WBC on presentation, lack of neutrophils in CSF, high CSF glucose and negative CSF PCR.    Recommend:  1.  Can continue Vancomycin, Ceftriaxone 2 grams IV q12, Ampicillin 2 grams IV q4hrs, Acyclovir 500 mg IV q8hrs for now  2. If no bacterial growth in blood or CSF cultures, can stop Vancomycin and Ampicillin in the AM on 9/20/23  3.  Add onto CSF:  West Nile CSF IgM, HSV1/2 PCR, [place] [facility] encephalitis panel  4.  Check Lyme serologies from blood  5.  Check RVP  6.  Check serum HIV test (4th generation)    ID team 1 will follow

## 2023-09-19 NOTE — PROGRESS NOTE ADULT - SUBJECTIVE AND OBJECTIVE BOX
INTERVAL HPI/OVERNIGHT EVENTS: LP done, studies sent. Began empiric coverage for bacterial meningitis -- Vanc 750mg q12, CTX 2g q12, Acyclovir 500 q8hr, Ampicillin 2g q4 + LR @ 80cc/hr. AM K repleted 10meq IV x3.     SUBJECTIVE: Patient seen and examined at bedside. Patient is intubated and sedated.       VITAL SIGNS:  ICU Vital Signs Last 24 Hrs  T(C): 37.3 (19 Sep 2023 06:03), Max: 38.7 (18 Sep 2023 22:11)  T(F): 99.1 (19 Sep 2023 06:03), Max: 101.7 (18 Sep 2023 22:11)  HR: 92 (19 Sep 2023 07:00) (66 - 129)  BP: 109/63 (19 Sep 2023 06:00) (96/54 - 255/151)  BP(mean): 79 (19 Sep 2023 06:00) (67 - 157)  ABP: --  ABP(mean): --  RR: 17 (19 Sep 2023 07:00) (14 - 26)  SpO2: 100% (19 Sep 2023 07:00) (96% - 100%)    O2 Parameters below as of 19 Sep 2023 06:00  Patient On (Oxygen Delivery Method): ventilator    O2 Concentration (%): 49      Mode: AC/ CMV (Assist Control/ Continuous Mandatory Ventilation), RR (machine): 14, TV (machine): 350, FiO2: 40, PEEP: 5, ITime: 1, MAP: 8, PIP: 16  Plateau pressure:   P/F ratio:     09-18 @ 07:01  -  09-19 @ 07:00  --------------------------------------------------------  IN: 517.6 mL / OUT: 510 mL / NET: 7.6 mL      CAPILLARY BLOOD GLUCOSE      POCT Blood Glucose.: 116 mg/dL (18 Sep 2023 17:14)      ECG: reviewed.    PHYSICAL EXAM:    GENERAL: lying in bed, intubated and sedated   HEAD:  Atraumatic, normocephalic  EYES: pupils constricted bilaterally, conjunctiva and sclera clear  NECK: Supple, trachea midline, no JVD  HEART: Regular rate and rhythm, no murmurs, rubs, or gallops  LUNGS: Intubated. Unlabored respirations. Clear to auscultation bilaterally, no crackles, wheezing, or rhonchi  ABDOMEN: Soft, nontender, nondistended, +BS  EXTREMITIES: 2+ peripheral pulses bilaterally. No clubbing, cyanosis, or edema  NERVOUS SYSTEM: Sedated, no gross focal deficits   SKIN: No rashes or lesions    MEDICATIONS:  MEDICATIONS  (STANDING):  acyclovir IVPB 500 milliGRAM(s) IV Intermittent every 8 hours  ampicillin  IVPB 2 Gram(s) IV Intermittent every 4 hours  cefTRIAXone   IVPB 2000 milliGRAM(s) IV Intermittent every 12 hours  chlorhexidine 0.12% Liquid 15 milliLiter(s) Oral Mucosa every 12 hours  dexAMETHasone  IVPB 7.5 milliGRAM(s) IV Intermittent every 6 hours  dexMEDEtomidine Infusion 0.5 MICROgram(s)/kG/Hr (6.24 mL/Hr) IV Continuous <Continuous>  enoxaparin Injectable 30 milliGRAM(s) SubCutaneous every 24 hours  fentaNYL   Infusion... 0.5 MICROgram(s)/kG/Hr (1.25 mL/Hr) IV Continuous <Continuous>  lactated ringers. 1000 milliLiter(s) (80 mL/Hr) IV Continuous <Continuous>  midazolam Infusion 0.02 mG/kG/Hr (1 mL/Hr) IV Continuous <Continuous>  niCARdipine Infusion 5 mG/Hr (25 mL/Hr) IV Continuous <Continuous>  norepinephrine Infusion 0.05 MICROgram(s)/kG/Min (4.68 mL/Hr) IV Continuous <Continuous>  potassium chloride  10 mEq/100 mL IVPB 10 milliEquivalent(s) IV Intermittent every 1 hour  thiamine IVPB 500 milliGRAM(s) IV Intermittent every 8 hours  vancomycin  IVPB 750 milliGRAM(s) IV Intermittent every 12 hours    MEDICATIONS  (PRN):      ALLERGIES:  Allergies    No Known Allergies    Intolerances        LABS:                        12.8   6.25  )-----------( 195      ( 19 Sep 2023 06:01 )             39.7     09-19    134<L>  |  97  |  8   ----------------------------<  127<H>  3.1<L>   |  25  |  0.81    Ca    8.5      19 Sep 2023 06:01  Phos  3.6     09-19  Mg     1.5     09-19    TPro  7.6  /  Alb  3.3  /  TBili  0.7  /  DBili  x   /  AST  18  /  ALT  13  /  AlkPhos  101  09-19      Urinalysis Basic - ( 19 Sep 2023 06:01 )    Color: x / Appearance: x / SG: x / pH: x  Gluc: 127 mg/dL / Ketone: x  / Bili: x / Urobili: x   Blood: x / Protein: x / Nitrite: x   Leuk Esterase: x / RBC: x / WBC x   Sq Epi: x / Non Sq Epi: x / Bacteria: x      ABG:      vBG:    Micro:     Urinalysis with Rflx Culture (collected 09-18-23 @ 18:32)         RADIOLOGY & ADDITIONAL TESTS: Reviewed. INTERVAL HPI/OVERNIGHT EVENTS: LP done, studies sent. Began empiric coverage for bacterial meningitis -- Vanc 750mg q12, CTX 2g q12, Acyclovir 500 q8hr, Ampicillin 2g q4 + LR @ 80cc/hr. AM K repleted 10meq IV x3.     SUBJECTIVE: Patient seen and examined at bedside. Patient is intubated and sedated.       VITAL SIGNS:  ICU Vital Signs Last 24 Hrs  T(C): 37.3 (19 Sep 2023 06:03), Max: 38.7 (18 Sep 2023 22:11)  T(F): 99.1 (19 Sep 2023 06:03), Max: 101.7 (18 Sep 2023 22:11)  HR: 92 (19 Sep 2023 07:00) (66 - 129)  BP: 109/63 (19 Sep 2023 06:00) (96/54 - 255/151)  BP(mean): 79 (19 Sep 2023 06:00) (67 - 157)  ABP: --  ABP(mean): --  RR: 17 (19 Sep 2023 07:00) (14 - 26)  SpO2: 100% (19 Sep 2023 07:00) (96% - 100%)    O2 Parameters below as of 19 Sep 2023 06:00  Patient On (Oxygen Delivery Method): ventilator    O2 Concentration (%): 49      Mode: AC/ CMV (Assist Control/ Continuous Mandatory Ventilation), RR (machine): 14, TV (machine): 350, FiO2: 40, PEEP: 5, ITime: 1, MAP: 8, PIP: 16  Plateau pressure:   P/F ratio:     09-18 @ 07:01  -  09-19 @ 07:00  --------------------------------------------------------  IN: 517.6 mL / OUT: 510 mL / NET: 7.6 mL      CAPILLARY BLOOD GLUCOSE      POCT Blood Glucose.: 116 mg/dL (18 Sep 2023 17:14)      ECG: reviewed.    PHYSICAL EXAM:    GENERAL: lying in bed, intubated and sedated   HEAD:  Atraumatic, normocephalic  EYES: pupils constricted bilaterally, round and equally reactive; conjunctiva and sclera clear  NECK: No neck stiffness. Supple, trachea midline, no JVD  HEART: Regular rate and rhythm, no murmurs, rubs, or gallops  LUNGS: Intubated. Unlabored respirations. Clear to auscultation bilaterally, no crackles, wheezing, or rhonchi  ABDOMEN: Soft, nontender, nondistended, +BS  EXTREMITIES: 2+ peripheral pulses bilaterally. No clubbing, cyanosis, or edema  NERVOUS SYSTEM: Sedated, non-responsive, no gross focal deficits   SKIN: No rashes or lesions    MEDICATIONS:  MEDICATIONS  (STANDING):  acyclovir IVPB 500 milliGRAM(s) IV Intermittent every 8 hours  ampicillin  IVPB 2 Gram(s) IV Intermittent every 4 hours  cefTRIAXone   IVPB 2000 milliGRAM(s) IV Intermittent every 12 hours  chlorhexidine 0.12% Liquid 15 milliLiter(s) Oral Mucosa every 12 hours  dexAMETHasone  IVPB 7.5 milliGRAM(s) IV Intermittent every 6 hours  dexMEDEtomidine Infusion 0.5 MICROgram(s)/kG/Hr (6.24 mL/Hr) IV Continuous <Continuous>  enoxaparin Injectable 30 milliGRAM(s) SubCutaneous every 24 hours  fentaNYL   Infusion... 0.5 MICROgram(s)/kG/Hr (1.25 mL/Hr) IV Continuous <Continuous>  lactated ringers. 1000 milliLiter(s) (80 mL/Hr) IV Continuous <Continuous>  midazolam Infusion 0.02 mG/kG/Hr (1 mL/Hr) IV Continuous <Continuous>  niCARdipine Infusion 5 mG/Hr (25 mL/Hr) IV Continuous <Continuous>  norepinephrine Infusion 0.05 MICROgram(s)/kG/Min (4.68 mL/Hr) IV Continuous <Continuous>  potassium chloride  10 mEq/100 mL IVPB 10 milliEquivalent(s) IV Intermittent every 1 hour  thiamine IVPB 500 milliGRAM(s) IV Intermittent every 8 hours  vancomycin  IVPB 750 milliGRAM(s) IV Intermittent every 12 hours    MEDICATIONS  (PRN):      ALLERGIES:  Allergies    No Known Allergies    Intolerances        LABS:                        12.8   6.25  )-----------( 195      ( 19 Sep 2023 06:01 )             39.7     09-19    134<L>  |  97  |  8   ----------------------------<  127<H>  3.1<L>   |  25  |  0.81    Ca    8.5      19 Sep 2023 06:01  Phos  3.6     09-19  Mg     1.5     09-19    TPro  7.6  /  Alb  3.3  /  TBili  0.7  /  DBili  x   /  AST  18  /  ALT  13  /  AlkPhos  101  09-19      Urinalysis Basic - ( 19 Sep 2023 06:01 )    Color: x / Appearance: x / SG: x / pH: x  Gluc: 127 mg/dL / Ketone: x  / Bili: x / Urobili: x   Blood: x / Protein: x / Nitrite: x   Leuk Esterase: x / RBC: x / WBC x   Sq Epi: x / Non Sq Epi: x / Bacteria: x       Urinalysis with Rflx Culture (collected 09-18-23 @ 18:32)         RADIOLOGY & ADDITIONAL TESTS: Reviewed. INTERVAL HPI/OVERNIGHT EVENTS: LP done, studies sent. Began empiric coverage for bacterial meningitis -- Vanc 750mg q12, CTX 2g q12, Acyclovir 500 q8hr, Ampicillin 2g q4 + LR @ 80cc/hr. AM K repleted 10meq IV x3.     SUBJECTIVE: Patient seen and examined at bedside. Patient is intubated and sedated.       VITAL SIGNS:  ICU Vital Signs Last 24 Hrs  T(C): 37.3 (19 Sep 2023 06:03), Max: 38.7 (18 Sep 2023 22:11)  T(F): 99.1 (19 Sep 2023 06:03), Max: 101.7 (18 Sep 2023 22:11)  HR: 92 (19 Sep 2023 07:00) (66 - 129)  BP: 109/63 (19 Sep 2023 06:00) (96/54 - 255/151)  BP(mean): 79 (19 Sep 2023 06:00) (67 - 157)  RR: 17 (19 Sep 2023 07:00) (14 - 26)  SpO2: 100% (19 Sep 2023 07:00) (96% - 100%)    O2 Parameters below as of 19 Sep 2023 06:00  Patient On (Oxygen Delivery Method): ventilator    O2 Concentration (%): 49      Mode: AC/ CMV (Assist Control/ Continuous Mandatory Ventilation), RR (machine): 14, TV (machine): 350, FiO2: 40, PEEP: 5, ITime: 1, MAP: 8, PIP: 16  Plateau pressure:   P/F ratio:     09-18 @ 07:01  -  09-19 @ 07:00  --------------------------------------------------------  IN: 517.6 mL / OUT: 510 mL / NET: 7.6 mL      CAPILLARY BLOOD GLUCOSE      POCT Blood Glucose.: 116 mg/dL (18 Sep 2023 17:14)      ECG: reviewed.    PHYSICAL EXAM:    GENERAL: lying in bed, intubated and sedated   HEAD:  Atraumatic, normocephalic  EYES: pupils constricted bilaterally, round and equally reactive; conjunctiva and sclera clear  NECK: No neck stiffness. Supple, trachea midline, no JVD  HEART: Regular rate and rhythm, no murmurs, rubs, or gallops  LUNGS: Intubated. Unlabored respirations. Clear to auscultation bilaterally, no crackles, wheezing, or rhonchi  ABDOMEN: Soft, nontender, nondistended, +BS  EXTREMITIES: 2+ peripheral pulses bilaterally. No clubbing, cyanosis, or edema  NERVOUS SYSTEM: Sedated, non-responsive, no gross focal deficits   SKIN: No rashes or lesions    MEDICATIONS:  MEDICATIONS  (STANDING):  acyclovir IVPB 500 milliGRAM(s) IV Intermittent every 8 hours  ampicillin  IVPB 2 Gram(s) IV Intermittent every 4 hours  cefTRIAXone   IVPB 2000 milliGRAM(s) IV Intermittent every 12 hours  chlorhexidine 0.12% Liquid 15 milliLiter(s) Oral Mucosa every 12 hours  dexAMETHasone  IVPB 7.5 milliGRAM(s) IV Intermittent every 6 hours  dexMEDEtomidine Infusion 0.5 MICROgram(s)/kG/Hr (6.24 mL/Hr) IV Continuous <Continuous>  enoxaparin Injectable 30 milliGRAM(s) SubCutaneous every 24 hours  fentaNYL   Infusion... 0.5 MICROgram(s)/kG/Hr (1.25 mL/Hr) IV Continuous <Continuous>  lactated ringers. 1000 milliLiter(s) (80 mL/Hr) IV Continuous <Continuous>  midazolam Infusion 0.02 mG/kG/Hr (1 mL/Hr) IV Continuous <Continuous>  niCARdipine Infusion 5 mG/Hr (25 mL/Hr) IV Continuous <Continuous>  norepinephrine Infusion 0.05 MICROgram(s)/kG/Min (4.68 mL/Hr) IV Continuous <Continuous>  potassium chloride  10 mEq/100 mL IVPB 10 milliEquivalent(s) IV Intermittent every 1 hour  thiamine IVPB 500 milliGRAM(s) IV Intermittent every 8 hours  vancomycin  IVPB 750 milliGRAM(s) IV Intermittent every 12 hours    MEDICATIONS  (PRN):      ALLERGIES:  Allergies    No Known Allergies    Intolerances        LABS:                        12.8   6.25  )-----------( 195      ( 19 Sep 2023 06:01 )             39.7     09-19    134<L>  |  97  |  8   ----------------------------<  127<H>  3.1<L>   |  25  |  0.81    Ca    8.5      19 Sep 2023 06:01  Phos  3.6     09-19  Mg     1.5     09-19    TPro  7.6  /  Alb  3.3  /  TBili  0.7  /  DBili  x   /  AST  18  /  ALT  13  /  AlkPhos  101  09-19      Urinalysis Basic - ( 19 Sep 2023 06:01 )    Color: x / Appearance: x / SG: x / pH: x  Gluc: 127 mg/dL / Ketone: x  / Bili: x / Urobili: x   Blood: x / Protein: x / Nitrite: x   Leuk Esterase: x / RBC: x / WBC x   Sq Epi: x / Non Sq Epi: x / Bacteria: x       Urinalysis with Rflx Culture (collected 09-18-23 @ 18:32)         RADIOLOGY & ADDITIONAL TESTS: Reviewed.

## 2023-09-19 NOTE — PROGRESS NOTE ADULT - ATTENDING COMMENTS
In brief, 62 year old female who presented with fever and AMS found to have severe hypertension who underwent CTH which showed no evidence of acute bleed. Patient intubated for LP which was performed CSF studies sent and was empirically started on vancomycin/ampicilin/CTX for possible bacterial meningitis, and acyclovir for possible encephalitis. CSF studies not consistent with bacterial meningitis. Planning on discontinuing abx after 24 hours of empiric coverage and to continue In brief, 62 year old female who presented with fever and AMS found to have severe hypertension who underwent CTH which showed no evidence of acute bleed. Patient required intubation and sedation for LP which was performed CSF studies sent. She was empirically started on vancomycin/ampicilin/CTX and steroids for possible bacterial meningitis, and acyclovir for possible encephalitis. CSF studies not consistent with bacterial meningitis. Planning on discontinuing abx after 24 hours of empiric coverage and to continue acyclovir. Sending viral studies. Will obtain MRI. Will keep intubated and sedated for now until after obtaining MRI. Will involve ID and neurology teams.     Patient examined. Pupils equal rounds and reactive to light. constricted due to sedation.   Sedated.   Drop in hemoglobin noted. Will send repeat hemoglobin this afternoon.

## 2023-09-19 NOTE — PROGRESS NOTE ADULT - ASSESSMENT
62F presents w/ AMS, elevated /106 w/ HA, and blurry vision likely 2/2 to HTN emergency currently improving on Nicardipine drip but continues to be altered. Patient was intubated and sedated 9/18 for LP.     Neuro  #AMS   Patient presented with thunderclap headache, blurry vision, AMS and hypertension. Differentials include Hypertensive emergency vs Pres vs meningitis   - Will wean sedation today and assess mental status   - Began empiric coverage for bacterial meningitis -- Vanc 750mg q12, CTX 2g q12, Acyclovir 500 q8hr, Ampicillin 2g q4 + LR @ 80cc/hr.  - LP unremarkable thus far; f/u results   - UTox negative   - MRI to r/o PRES     #HA  Trauma to the head 3 days prior w/o changes on CT imaging.  -Tx w/ Tylenol PRN    [facility]S  #Hypertensive emergency  Treated w/ 2 runs of Nicardipine drip 25mL/hr, 1 10mg Labetalol IVP, goal of 20% reduction.  -  -MRI to r/o PRES - if confirmed will target lower BP goals.  -No EKG changes    Respiratory  - Plan to extubate today     Renal  No MARLENE    Endo  - f/u A1c and Lipid panel (cholesterol 210) to evaluate chronicity of HTN     Prophylactic  DVT: Lovenox      62F presents w/ AMS, elevated /106 w/ HA, and blurry vision likely 2/2 to HTN emergency improved on Nicardipine drip but continues to be altered. Patient was intubated and sedated 9/18 for LP.     Neuro  #AMS   Patient presented with thunderclap headache, blurry vision, AMS and hypertension. Differentials include Hypertensive emergency vs Pres vs meningitis. UTox negative.   - c/w fentanyl and propofol ggt for sedation   - d/c midazolam ggt to avoid overcorrection of HTN   - Began empiric coverage for bacterial meningitis -- Vanc 750mg q12, CTX 2g q12, Acyclovir 500 q8hr, Ampicillin 2g q4 + LR @ 80cc/hr.  - f/u LP results; unremarkable thus far   - Add on WNV   - MRI to r/o PRES   - f/u vitamin B1, B6, folate, B12, vitamin D 25-OH, vitamin E, TSH, RPR, HIV    #HA  Trauma to the head 3 days prior w/o changes on CT imaging.  - SHRADDHA     CVS  #Hypertensive emergency  Treated in ED w/ 2 runs of Nicardipine drip 25mL/hr, 1 10mg Labetalol IVP, goal of 20% reduction. BP has since normalized to SBP 90s-150s.   - d/c nicardipine drip  -MRI to r/o PRES  -No EKG changes    Respiratory  - Maintain extubation until MRI obtained     Renal  No MARLENE    Endo  - f/u A1c and Lipid panel (cholesterol 210) to evaluate chronicity of HTN     ID  #SIRS (RESOLVED)  2/4 SIRS criteria (febrile, tachycardic) with possible meningitis source. CXR negative, UA negative  - f/u LP results   - c/w empiric treatment of meningitis (vanc 750mg q8hr, CTX 2g q12hr, ampicillin 2g q4hr, and acyclovir 500mg q8hr)   - LR @ 80cc/hr while receiving acyclovir   - f/u blood/urine cultures      Heme  Hgb dropped 15.4 --> 12.8. Potentially 2/2 dilution, although platelets stable. No overt signs of bleeding on exam.   - Trend H/H     Prophylactic  Fluids: LR @ 80cc/hr   Electrolytes: replete as needed   Nutrition: NPO   DVT: Lovenox 30mg subQ daily   Dispo: MICU      62F presents w/ AMS, elevated /106 w/ HA, and blurry vision likely 2/2 to HTN emergency improved on Nicardipine drip but continues to be altered. Patient was intubated and sedated 9/18 for LP.     Neuro  #AMS   Patient presented with thunderclap headache, blurry vision, AMS and hypertension. Differentials include Hypertensive emergency vs Pres vs meningitis. UTox negative.   - c/w fentanyl and propofol ggt for sedation   - d/c midazolam ggt to avoid overcorrection of HTN   - Began empiric coverage for bacterial meningitis -- Vanc 750mg q12, CTX 2g q12, Acyclovir 500 q8hr, Ampicillin 2g q4 + LR @ 80cc/hr.  - f/u LP results; unremarkable thus far   - Add to CSF studies: WNV   - MRI to r/o PRES   - f/u vitamin B1, B6, folate, B12, vitamin D 25-OH, vitamin E, TSH, RPR, HIV    #HA  Trauma to the head 3 days prior w/o changes on CT imaging.  - SHRADDHA     CVS  #Hypertensive emergency  Treated in ED w/ 2 runs of Nicardipine drip 25mL/hr, 1 10mg Labetalol IVP, goal of 20% reduction. BP has since normalized to SBP 90s-150s.   - d/c nicardipine drip  -MRI to r/o PRES  -No EKG changes    Respiratory  - Maintain extubation until MRI obtained     Renal  No MARLENE    Endo  - f/u A1c and Lipid panel (cholesterol 210) to evaluate chronicity of HTN     ID  #SIRS (RESOLVED)  2/4 SIRS criteria (febrile, tachycardic) with possible meningitis source. CXR negative, UA negative  - f/u LP results   - c/w empiric treatment of meningitis (vanc 750mg q8hr, CTX 2g q12hr, ampicillin 2g q4hr, and acyclovir 500mg q8hr)   - LR @ 80cc/hr while receiving acyclovir   - f/u blood/urine cultures      Heme  Hgb dropped 15.4 --> 12.8. Potentially 2/2 dilution, although platelets stable. No overt signs of bleeding on exam.   - Trend H/H     Prophylactic  Fluids: LR @ 80cc/hr   Electrolytes: replete as needed. Repeat BMP 9/19   Nutrition: NPO   GI: pantoprazole 40mg IV daily   DVT: Lovenox 30mg subQ daily   Dispo: MICU      62F presents w/ AMS, elevated /106 w/ HA, and blurry vision likely 2/2 to HTN emergency improved on Nicardipine drip but continues to be altered. Patient was intubated and sedated 9/18 for LP.     Neuro  #AMS   Patient presented with thunderclap headache, blurry vision, AMS and hypertension. Differentials include Hypertensive emergency vs Pres vs meningitis. UTox negative.   - c/w fentanyl and propofol ggt for sedation   - d/c midazolam ggt to avoid overcorrection of HTN   - c/w empiric coverage for bacterial meningitis -- Vanc 750mg q12, CTX 2g q12, Acyclovir 500 q8hr, Ampicillin 2g q4 + LR @ 80cc/hr.  - f/u LP results; CSF protein 79, otherwise unremarkable   - Add to CSF studies: [place] virus, coxsackievirus, echovirus, human parechovirus, HIV, EBV   - MRI to r/o PRES   - f/u vitamin B1, B6, folate, B12, vitamin D 25-OH, vitamin E, TSH, RPR, HIV    #HA  Trauma to the head 3 days prior w/o changes on CT imaging.  - SHRADDHA     CVS  #Hypertensive emergency  Treated in ED w/ 2 runs of Nicardipine drip 25mL/hr, 1 10mg Labetalol IVP, goal of 20% reduction. BP has since normalized to SBP 90s-150s.   - d/c nicardipine drip  -MRI to r/o PRES  -No EKG changes    Respiratory  - Maintain extubation until MRI obtained     Renal  No MARLENE    Endo  - f/u A1c and Lipid panel (cholesterol 210) to evaluate chronicity of HTN     ID  #SIRS (RESOLVED)  2/4 SIRS criteria (febrile, tachycardic) with possible meningitis source. CXR negative, UA negative  - f/u LP results   - c/w empiric treatment of meningitis (vanc 750mg q8hr, CTX 2g q12hr, ampicillin 2g q4hr, and acyclovir 500mg q8hr)   - LR @ 80cc/hr while receiving acyclovir   - f/u blood/urine cultures      Heme  Hgb dropped 15.4 --> 12.8. Potentially 2/2 dilution, although platelets stable. No overt signs of bleeding on exam.   - Trend H/H     Prophylactic  Fluids: LR @ 80cc/hr   Electrolytes: replete as needed. Repeat BMP 9/19   Nutrition: NPO   GI: pantoprazole 40mg IV daily   DVT: Lovenox 30mg subQ daily   Dispo: MICU      62F presents w/ AMS, elevated /106 w/ HA, and blurry vision likely 2/2 to HTN emergency improved on Nicardipine drip but continues to be altered. Patient was intubated and sedated 9/18 for LP.     Neuro  #AMS   Patient presented with thunderclap headache, blurry vision, AMS and hypertension. Differentials include Hypertensive emergency vs Pres vs meningitis. UTox negative.   - c/w fentanyl and propofol ggt for sedation   - d/c midazolam ggt to avoid overcorrection of HTN, goal of 170 systolic  - c/w empiric coverage for bacterial meningitis -- Vanc 750mg q12, CTX 2g q12, Acyclovir 500 q8hr, Ampicillin 2g q4 + LR @ 80cc/hr.  - f/u LP results; CSF protein 79, otherwise unremarkable   - Add to CSF studies: [place] virus, coxsackievirus, echovirus, human parechovirus, HIV, EBV   - MRI to r/o PRES   - f/u vitamin B1, B6, folate, B12, vitamin D 25-OH, vitamin E, TSH, RPR, HIV    #HA  Trauma to the head 3 days prior w/o changes on CT imaging.  - SHRADDHA     CVS  #Hypertensive emergency  Treated in ED w/ 2 runs of Nicardipine drip 25mL/hr, 1 10mg Labetalol IVP, goal of 20% reduction. BP has since normalized to SBP 90s-150s.   - d/c nicardipine drip  -MRI to r/o PRES  -No EKG changes    Respiratory  - Maintain extubation until MRI obtained     Renal  No MARLENE    Endo  - f/u A1c and Lipid panel (cholesterol 210) to evaluate chronicity of HTN     ID  #SIRS (RESOLVED)  2/4 SIRS criteria (febrile, tachycardic) with possible meningitis source. CXR negative, UA negative  - f/u LP results   - c/w empiric treatment of meningitis (vanc 750mg q8hr, CTX 2g q12hr, ampicillin 2g q4hr, and acyclovir 500mg q8hr)   - LR @ 80cc/hr while receiving acyclovir   - f/u blood/urine cultures      Heme  Hgb dropped 15.4 --> 12.8. Potentially 2/2 dilution, although platelets stable. No overt signs of bleeding on exam.   - Trend H/H     Prophylactic  Fluids: LR @ 80cc/hr   Electrolytes: replete as needed. Repeat BMP 9/19   Nutrition: NPO   GI: pantoprazole 40mg IV daily   DVT: Lovenox 30mg subQ daily   Dispo: MICU

## 2023-09-19 NOTE — PROCEDURE NOTE - NSPROCDETAILS_GEN_ALL_CORE
location identified, draped/prepped, sterile technique used, needle inserted/introduced/area cleaned in sterile fashion
location identified, draped/prepped, sterile technique used/blood seen on insertion/dressing applied/flushes easily/secured in place/sterile technique, catheter placed

## 2023-09-19 NOTE — CONSULT NOTE ADULT - ASSESSMENT
62 year old female with unknown PMH is being evaluated for headache followed by confusion. Patient is currently intubated, sedated for purpose of LP and further procedure. Exam limited but notable for rigidity. Differentials include meningitis/encephalitis, PRESS , NMS, Serotonin syndrome. CSF with elevated protein and no pleocytosis: makes meningitis unlikely.  In absence of sufficient collateral, unable to determine if there is any use of offending drug that can cause NMS.    Recommendations:  - Obtain MRI of brain w/wo contrast   - Obtain video EEG  - Follow up CSF infectious workup including PCR.  - Obtain further collateral for preceding event and medications  - Please call Neurology service for any change in patient status.  - Will continue to follow      Discussed with Neurology attending 62 year old female with unknown PMH is being evaluated for headache followed by confusion. Patient is currently intubated, sedated for purpose of LP and further procedure. Exam limited but notable for rigidity. Differentials include meningitis/encephalitis, PRESS , NMS, Serotonin syndrome. CSF with elevated protein and no pleocytosis makes bacterial meningitis unlikely, but viral meningitis possible.  In absence of sufficient collateral, unable to determine if there is any use of offending drug that can cause neuroleptic malignant syndrome or serotonin syndrome.    Recommendations:  - Obtain MRI of brain w/wo contrast   - Obtain video EEG 24 hours  - Follow up CSF infectious workup including PCR.  - Obtain further collateral for preceding event and medications  - Please call Neurology service for any change in patient status.  - Will continue to follow      Discussed with Neurology attending

## 2023-09-20 LAB
ALBUMIN SERPL ELPH-MCNC: 2.3 G/DL — LOW (ref 3.3–5)
ALP SERPL-CCNC: 67 U/L — SIGNIFICANT CHANGE UP (ref 40–120)
ALT FLD-CCNC: 8 U/L — LOW (ref 10–45)
ANION GAP SERPL CALC-SCNC: 10 MMOL/L — SIGNIFICANT CHANGE UP (ref 5–17)
APPEARANCE UR: CLEAR — SIGNIFICANT CHANGE UP
AST SERPL-CCNC: 13 U/L — SIGNIFICANT CHANGE UP (ref 10–40)
BACTERIA # UR AUTO: PRESENT /HPF
BASOPHILS # BLD AUTO: 0.01 K/UL — SIGNIFICANT CHANGE UP (ref 0–0.2)
BASOPHILS NFR BLD AUTO: 0.1 % — SIGNIFICANT CHANGE UP (ref 0–2)
BILIRUB SERPL-MCNC: 0.2 MG/DL — SIGNIFICANT CHANGE UP (ref 0.2–1.2)
BILIRUB UR-MCNC: NEGATIVE — SIGNIFICANT CHANGE UP
BUN SERPL-MCNC: 8 MG/DL — SIGNIFICANT CHANGE UP (ref 7–23)
CALCIUM SERPL-MCNC: 7.1 MG/DL — LOW (ref 8.4–10.5)
CHLORIDE SERPL-SCNC: 112 MMOL/L — HIGH (ref 96–108)
CO2 SERPL-SCNC: 21 MMOL/L — LOW (ref 22–31)
COLOR SPEC: YELLOW — SIGNIFICANT CHANGE UP
CREAT ?TM UR-MCNC: 23 MG/DL — SIGNIFICANT CHANGE UP
CREAT SERPL-MCNC: 0.58 MG/DL — SIGNIFICANT CHANGE UP (ref 0.5–1.3)
CRYPTOC AG CSF-ACNC: NEGATIVE — SIGNIFICANT CHANGE UP
DIFF PNL FLD: ABNORMAL
EGFR: 101 ML/MIN/1.73M2 — SIGNIFICANT CHANGE UP
EOSINOPHIL # BLD AUTO: 0 K/UL — SIGNIFICANT CHANGE UP (ref 0–0.5)
EOSINOPHIL NFR BLD AUTO: 0 % — SIGNIFICANT CHANGE UP (ref 0–6)
EPI CELLS # UR: SIGNIFICANT CHANGE UP /HPF (ref 0–5)
GLUCOSE SERPL-MCNC: 101 MG/DL — HIGH (ref 70–99)
GLUCOSE UR QL: NEGATIVE — SIGNIFICANT CHANGE UP
HCT VFR BLD CALC: 38 % — SIGNIFICANT CHANGE UP (ref 34.5–45)
HGB BLD-MCNC: 12.2 G/DL — SIGNIFICANT CHANGE UP (ref 11.5–15.5)
IMM GRANULOCYTES NFR BLD AUTO: 0.1 % — SIGNIFICANT CHANGE UP (ref 0–0.9)
KETONES UR-MCNC: NEGATIVE — SIGNIFICANT CHANGE UP
LABORATORY COMMENT REPORT: SIGNIFICANT CHANGE UP
LDH CSF L TO P-CCNC: 30 U/L — SIGNIFICANT CHANGE UP
LDH FLD-CCNC: 30 U/L — SIGNIFICANT CHANGE UP
LEUKOCYTE ESTERASE UR-ACNC: NEGATIVE — SIGNIFICANT CHANGE UP
LYMPHOCYTES # BLD AUTO: 0.92 K/UL — LOW (ref 1–3.3)
LYMPHOCYTES # BLD AUTO: 13.6 % — SIGNIFICANT CHANGE UP (ref 13–44)
MAGNESIUM SERPL-MCNC: 1.4 MG/DL — LOW (ref 1.6–2.6)
MCHC RBC-ENTMCNC: 26.2 PG — LOW (ref 27–34)
MCHC RBC-ENTMCNC: 32.1 GM/DL — SIGNIFICANT CHANGE UP (ref 32–36)
MCV RBC AUTO: 81.7 FL — SIGNIFICANT CHANGE UP (ref 80–100)
MONOCYTES # BLD AUTO: 0.96 K/UL — HIGH (ref 0–0.9)
MONOCYTES NFR BLD AUTO: 14.2 % — HIGH (ref 2–14)
NEUTROPHILS # BLD AUTO: 4.88 K/UL — SIGNIFICANT CHANGE UP (ref 1.8–7.4)
NEUTROPHILS NFR BLD AUTO: 72 % — SIGNIFICANT CHANGE UP (ref 43–77)
NITRITE UR-MCNC: NEGATIVE — SIGNIFICANT CHANGE UP
NON-GYNECOLOGICAL CYTOLOGY STUDY: SIGNIFICANT CHANGE UP
NRBC # BLD: 0 /100 WBCS — SIGNIFICANT CHANGE UP (ref 0–0)
OSMOLALITY UR: 148 MOSM/KG — LOW (ref 300–900)
PH UR: 6.5 — SIGNIFICANT CHANGE UP (ref 5–8)
PHOSPHATE SERPL-MCNC: 2.4 MG/DL — LOW (ref 2.5–4.5)
PLATELET # BLD AUTO: 150 K/UL — SIGNIFICANT CHANGE UP (ref 150–400)
POTASSIUM SERPL-MCNC: 3 MMOL/L — LOW (ref 3.5–5.3)
POTASSIUM SERPL-SCNC: 3 MMOL/L — LOW (ref 3.5–5.3)
PROT SERPL-MCNC: 5.6 G/DL — LOW (ref 6–8.3)
PROT UR-MCNC: NEGATIVE MG/DL — SIGNIFICANT CHANGE UP
RAPID RVP RESULT: SIGNIFICANT CHANGE UP
RBC # BLD: 4.65 M/UL — SIGNIFICANT CHANGE UP (ref 3.8–5.2)
RBC # FLD: 18 % — HIGH (ref 10.3–14.5)
RBC CASTS # UR COMP ASSIST: ABNORMAL /HPF
SARS-COV-2 RNA SPEC QL NAA+PROBE: SIGNIFICANT CHANGE UP
SODIUM SERPL-SCNC: 143 MMOL/L — SIGNIFICANT CHANGE UP (ref 135–145)
SODIUM UR-SCNC: <20 MMOL/L — SIGNIFICANT CHANGE UP
SOURCE HSV 1/2: SIGNIFICANT CHANGE UP
SP GR SPEC: <=1.005 — SIGNIFICANT CHANGE UP (ref 1–1.03)
UROBILINOGEN FLD QL: 0.2 E.U./DL — SIGNIFICANT CHANGE UP
WBC # BLD: 6.78 K/UL — SIGNIFICANT CHANGE UP (ref 3.8–10.5)
WBC # FLD AUTO: 6.78 K/UL — SIGNIFICANT CHANGE UP (ref 3.8–10.5)
WBC UR QL: < 5 /HPF — SIGNIFICANT CHANGE UP

## 2023-09-20 PROCEDURE — 99291 CRITICAL CARE FIRST HOUR: CPT

## 2023-09-20 PROCEDURE — 70553 MRI BRAIN STEM W/O & W/DYE: CPT | Mod: 26

## 2023-09-20 PROCEDURE — 99223 1ST HOSP IP/OBS HIGH 75: CPT

## 2023-09-20 PROCEDURE — 99232 SBSQ HOSP IP/OBS MODERATE 35: CPT

## 2023-09-20 RX ORDER — MIDAZOLAM HYDROCHLORIDE 1 MG/ML
2 INJECTION, SOLUTION INTRAMUSCULAR; INTRAVENOUS ONCE
Refills: 0 | Status: DISCONTINUED | OUTPATIENT
Start: 2023-09-20 | End: 2023-09-20

## 2023-09-20 RX ORDER — MIDAZOLAM HYDROCHLORIDE 1 MG/ML
4 INJECTION, SOLUTION INTRAMUSCULAR; INTRAVENOUS EVERY 12 HOURS
Refills: 0 | Status: DISCONTINUED | OUTPATIENT
Start: 2023-09-20 | End: 2023-09-20

## 2023-09-20 RX ORDER — POTASSIUM CHLORIDE 20 MEQ
10 PACKET (EA) ORAL
Refills: 0 | Status: COMPLETED | OUTPATIENT
Start: 2023-09-20 | End: 2023-09-20

## 2023-09-20 RX ORDER — NICARDIPINE HYDROCHLORIDE 30 MG/1
5 CAPSULE, EXTENDED RELEASE ORAL
Qty: 40 | Refills: 0 | Status: DISCONTINUED | OUTPATIENT
Start: 2023-09-20 | End: 2023-09-21

## 2023-09-20 RX ORDER — DEXMEDETOMIDINE HYDROCHLORIDE IN 0.9% SODIUM CHLORIDE 4 UG/ML
0.1 INJECTION INTRAVENOUS
Qty: 400 | Refills: 0 | Status: DISCONTINUED | OUTPATIENT
Start: 2023-09-20 | End: 2023-09-21

## 2023-09-20 RX ORDER — MIDAZOLAM HYDROCHLORIDE 1 MG/ML
2 INJECTION, SOLUTION INTRAMUSCULAR; INTRAVENOUS EVERY 12 HOURS
Refills: 0 | Status: DISCONTINUED | OUTPATIENT
Start: 2023-09-20 | End: 2023-09-20

## 2023-09-20 RX ORDER — MAGNESIUM SULFATE 500 MG/ML
1 VIAL (ML) INJECTION ONCE
Refills: 0 | Status: COMPLETED | OUTPATIENT
Start: 2023-09-20 | End: 2023-09-20

## 2023-09-20 RX ADMIN — CEFTRIAXONE 100 MILLIGRAM(S): 500 INJECTION, POWDER, FOR SOLUTION INTRAMUSCULAR; INTRAVENOUS at 19:46

## 2023-09-20 RX ADMIN — PANTOPRAZOLE SODIUM 40 MILLIGRAM(S): 20 TABLET, DELAYED RELEASE ORAL at 12:12

## 2023-09-20 RX ADMIN — ENOXAPARIN SODIUM 30 MILLIGRAM(S): 100 INJECTION SUBCUTANEOUS at 07:26

## 2023-09-20 RX ADMIN — CHLORHEXIDINE GLUCONATE 15 MILLILITER(S): 213 SOLUTION TOPICAL at 06:18

## 2023-09-20 RX ADMIN — NICARDIPINE HYDROCHLORIDE 25 MG/HR: 30 CAPSULE, EXTENDED RELEASE ORAL at 21:39

## 2023-09-20 RX ADMIN — SODIUM CHLORIDE 80 MILLILITER(S): 9 INJECTION, SOLUTION INTRAVENOUS at 16:29

## 2023-09-20 RX ADMIN — Medication 100 MILLIEQUIVALENT(S): at 06:22

## 2023-09-20 RX ADMIN — FENTANYL CITRATE 9.98 MICROGRAM(S)/KG/HR: 50 INJECTION INTRAVENOUS at 06:23

## 2023-09-20 RX ADMIN — Medication 216 GRAM(S): at 04:22

## 2023-09-20 RX ADMIN — Medication 105 MILLIGRAM(S): at 09:30

## 2023-09-20 RX ADMIN — DEXMEDETOMIDINE HYDROCHLORIDE IN 0.9% SODIUM CHLORIDE 1.25 MICROGRAM(S)/KG/HR: 4 INJECTION INTRAVENOUS at 17:25

## 2023-09-20 RX ADMIN — CEFTRIAXONE 100 MILLIGRAM(S): 500 INJECTION, POWDER, FOR SOLUTION INTRAMUSCULAR; INTRAVENOUS at 06:19

## 2023-09-20 RX ADMIN — Medication 100 MILLIEQUIVALENT(S): at 09:30

## 2023-09-20 RX ADMIN — Medication 100 MILLIGRAM(S): at 16:29

## 2023-09-20 RX ADMIN — Medication 100 MILLIGRAM(S): at 00:06

## 2023-09-20 RX ADMIN — Medication 100 GRAM(S): at 08:33

## 2023-09-20 RX ADMIN — Medication 105 MILLIGRAM(S): at 17:30

## 2023-09-20 RX ADMIN — DEXMEDETOMIDINE HYDROCHLORIDE IN 0.9% SODIUM CHLORIDE 1.25 MICROGRAM(S)/KG/HR: 4 INJECTION INTRAVENOUS at 22:00

## 2023-09-20 RX ADMIN — MIDAZOLAM HYDROCHLORIDE 4 MILLIGRAM(S): 1 INJECTION, SOLUTION INTRAMUSCULAR; INTRAVENOUS at 14:09

## 2023-09-20 RX ADMIN — Medication 250 MILLIGRAM(S): at 00:10

## 2023-09-20 RX ADMIN — Medication 100 MILLIGRAM(S): at 07:23

## 2023-09-20 RX ADMIN — SODIUM CHLORIDE 80 MILLILITER(S): 9 INJECTION, SOLUTION INTRAVENOUS at 06:23

## 2023-09-20 RX ADMIN — PROPOFOL 7.49 MICROGRAM(S)/KG/MIN: 10 INJECTION, EMULSION INTRAVENOUS at 12:11

## 2023-09-20 RX ADMIN — Medication 216 GRAM(S): at 00:07

## 2023-09-20 RX ADMIN — Medication 100 MILLIEQUIVALENT(S): at 10:43

## 2023-09-20 RX ADMIN — Medication 105 MILLIGRAM(S): at 00:57

## 2023-09-20 RX ADMIN — CHLORHEXIDINE GLUCONATE 1 APPLICATION(S): 213 SOLUTION TOPICAL at 06:21

## 2023-09-20 RX ADMIN — Medication 216 GRAM(S): at 07:24

## 2023-09-20 RX ADMIN — MIDAZOLAM HYDROCHLORIDE 2 MILLIGRAM(S): 1 INJECTION, SOLUTION INTRAMUSCULAR; INTRAVENOUS at 08:33

## 2023-09-20 NOTE — PROGRESS NOTE ADULT - ATTENDING COMMENTS
62 year old female admitted with AMS and fever and severe hypertension. Intubated for LP and initally started on CTX/vano/ampicillin for bacterial meningitis coverage, acyclovir for herpes encephalitis coverage and BP lowered slowly. Now with CSF not consistent with bacterial meningitis. discontinuing vanco and ampicillin. Will continue CTX until lyme ruled out and acyclovir when HSV CSF negative. Will obtain MRI today and plan for awakening trial and extubation post MRI. Plan for EEG following MRI. appreciate neurology and infectious disease input.   UA on admission noted with proteinuria, resend today.

## 2023-09-20 NOTE — DIETITIAN INITIAL EVALUATION ADULT - OTHER INFO
62F presents w/ AMS, elevated /106 w/ HA, and blurry vision likely 2/2 to HTN emergency currently improving on Nicardipine drip but continues to be altered.    62F presents w/ AMS, elevated /106 w/ HA, and blurry vision likely 2/2 to HTN emergency currently improving on Nicardipine drip but continues to be altered.    Pt care discussed in interdisciplinary care team rounds. Rx and labs reviewed. Pt intubated and sedated at time of assessment; vent to VC-AC, , fentanyl gtt, and propofol at 15 ml/hr (396 calories/day). Pt presents for AMS and fall several days before admission with head trauma and stepped up to MICU for management of hypertensive emergency. Concern for meningitis; awaiting further work up. Pt to remain intubated pending MRI, then plan for possible extubation. Electrolytes low; 24hr urine collection ordered and urine lytes. No other reports GI distress or further nutritional concerns at this time. RDN will continue to reassess, intervene, and monitor as appropriate.     Pain: No non-verbal indicators   GI: Abdomen rounded, +BS x4, last bowel movement PTA  Skin: Warm/Dry/Intact, no edema assessed

## 2023-09-20 NOTE — DIETITIAN INITIAL EVALUATION ADULT - NSFNSGIIOFT_GEN_A_CORE
09-19-23 @ 07:01  -  09-20-23 @ 07:00  --------------------------------------------------------  OUT:    Stool (mL): 0 mL  Total OUT: 0 mL    Total NET: 0 mL

## 2023-09-20 NOTE — PROGRESS NOTE ADULT - ATTENDING COMMENTS
Agree with above. Ok to stop Vancomycin and ampicillin given low suspicion for Listeria and Strep pneumo meningitis.  Continue Acyclovir. If HSV1/2 CSF PCR is negative, then stop Acyclovir.  Continue Ceftriaxone 2 grams IV q12 for while we wait for results from other cultures and testing. Ok to stop droplet precautions as it's only indicated for neisseria meningitis which this patient does not have

## 2023-09-20 NOTE — DIETITIAN INITIAL EVALUATION ADULT - PERTINENT LABORATORY DATA
09-20    143  |  112<H>  |  8   ----------------------------<  101<H>  3.0<L>   |  21<L>  |  0.58    Ca    7.1<L>      20 Sep 2023 04:47  Phos  2.4     09-20  Mg     1.4     09-20    TPro  5.6<L>  /  Alb  2.3<L>  /  TBili  0.2  /  DBili  x   /  AST  13  /  ALT  8<L>  /  AlkPhos  67  09-20

## 2023-09-20 NOTE — PROGRESS NOTE ADULT - SUBJECTIVE AND OBJECTIVE BOX
INFECTIOUS DISEASES CONSULT FOLLOW-UP NOTE    INTERVAL HPI/OVERNIGHT EVENTS:      ROS:   Constitutional, eyes, ENT, cardiovascular, respiratory, gastrointestinal, genitourinary, integumentary, neurological, psychiatric and heme/lymph are otherwise negative other than noted above       ANTIBIOTICS/RELEVANT:    MEDICATIONS  (STANDING):  acyclovir IVPB 500 milliGRAM(s) IV Intermittent every 8 hours  cefTRIAXone   IVPB 2000 milliGRAM(s) IV Intermittent every 12 hours  chlorhexidine 0.12% Liquid 15 milliLiter(s) Oral Mucosa every 12 hours  chlorhexidine 2% Cloths 1 Application(s) Topical <User Schedule>  enoxaparin Injectable 30 milliGRAM(s) SubCutaneous every 24 hours  fentaNYL   Infusion. 2 MICROgram(s)/kG/Hr (9.98 mL/Hr) IV Continuous <Continuous>  lactated ringers. 1000 milliLiter(s) (80 mL/Hr) IV Continuous <Continuous>  norepinephrine Infusion 0.05 MICROgram(s)/kG/Min (4.68 mL/Hr) IV Continuous <Continuous>  pantoprazole  Injectable 40 milliGRAM(s) IV Push daily  propofol Infusion 25.017 MICROgram(s)/kG/Min (7.49 mL/Hr) IV Continuous <Continuous>  thiamine IVPB 500 milliGRAM(s) IV Intermittent every 8 hours    MEDICATIONS  (PRN):        Vital Signs Last 24 Hrs  T(C): 36.5 (20 Sep 2023 09:08), Max: 37.1 (19 Sep 2023 13:48)  T(F): 97.7 (20 Sep 2023 09:08), Max: 98.8 (19 Sep 2023 13:48)  HR: 73 (20 Sep 2023 12:00) (63 - 88)  BP: 162/84 (20 Sep 2023 12:00) (138/68 - 192/95)  BP(mean): 115 (20 Sep 2023 12:00) (95 - 135)  RR: 14 (20 Sep 2023 12:00) (14 - 16)  SpO2: 100% (20 Sep 2023 12:00) (95% - 100%)    Parameters below as of 20 Sep 2023 12:00  Patient On (Oxygen Delivery Method): ventilator    O2 Concentration (%): 30    09-19-23 @ 07:01  -  09-20-23 @ 07:00  --------------------------------------------------------  IN: 3758.6 mL / OUT: 2115 mL / NET: 1643.6 mL    09-20-23 @ 07:01  -  09-20-23 @ 12:36  --------------------------------------------------------  IN: 985 mL / OUT: 262 mL / NET: 723 mL      PHYSICAL EXAM:  Constitutional: alert, NAD  Eyes: the sclera and conjunctiva were normal.   ENT: the ears and nose were normal in appearance.   Neck: the appearance of the neck was normal and the neck was supple.   Pulmonary: no respiratory distress and lungs were clear to auscultation bilaterally.   Heart: heart rate was normal and rhythm regular, normal S1 and S2  Vascular:. there was no peripheral edema  Abdomen: normal bowel sounds, soft, non-tender  Neurological: no focal deficits.   Psychiatric: the affect was normal        LABS:                        12.2   6.78  )-----------( 150      ( 20 Sep 2023 04:47 )             38.0     09-20    143  |  112<H>  |  8   ----------------------------<  101<H>  3.0<L>   |  21<L>  |  0.58    Ca    7.1<L>      20 Sep 2023 04:47  Phos  2.4     09-20  Mg     1.4     09-20    TPro  5.6<L>  /  Alb  2.3<L>  /  TBili  0.2  /  DBili  x   /  AST  13  /  ALT  8<L>  /  AlkPhos  67  09-20      Urinalysis Basic - ( 20 Sep 2023 11:28 )    Color: Yellow / Appearance: Clear / SG: <=1.005 / pH: x  Gluc: x / Ketone: NEGATIVE  / Bili: Negative / Urobili: 0.2 E.U./dL   Blood: x / Protein: NEGATIVE mg/dL / Nitrite: NEGATIVE   Leuk Esterase: NEGATIVE / RBC: 5-10 /HPF / WBC < 5 /HPF   Sq Epi: x / Non Sq Epi: x / Bacteria: Present /HPF        MICROBIOLOGY:      RADIOLOGY & ADDITIONAL STUDIES:  Reviewed INFECTIOUS DISEASES CONSULT FOLLOW-UP NOTE    INTERVAL HPI/OVERNIGHT EVENTS:      ROS:   Constitutional, eyes, ENT, cardiovascular, respiratory, gastrointestinal, genitourinary, integumentary, neurological, psychiatric and heme/lymph are otherwise negative other than noted above       ANTIBIOTICS/RELEVANT:    MEDICATIONS  (STANDING):  acyclovir IVPB 500 milliGRAM(s) IV Intermittent every 8 hours  cefTRIAXone   IVPB 2000 milliGRAM(s) IV Intermittent every 12 hours  chlorhexidine 0.12% Liquid 15 milliLiter(s) Oral Mucosa every 12 hours  chlorhexidine 2% Cloths 1 Application(s) Topical <User Schedule>  enoxaparin Injectable 30 milliGRAM(s) SubCutaneous every 24 hours  fentaNYL   Infusion. 2 MICROgram(s)/kG/Hr (9.98 mL/Hr) IV Continuous <Continuous>  lactated ringers. 1000 milliLiter(s) (80 mL/Hr) IV Continuous <Continuous>  norepinephrine Infusion 0.05 MICROgram(s)/kG/Min (4.68 mL/Hr) IV Continuous <Continuous>  pantoprazole  Injectable 40 milliGRAM(s) IV Push daily  propofol Infusion 25.017 MICROgram(s)/kG/Min (7.49 mL/Hr) IV Continuous <Continuous>  thiamine IVPB 500 milliGRAM(s) IV Intermittent every 8 hours    MEDICATIONS  (PRN):        Vital Signs Last 24 Hrs  T(C): 36.5 (20 Sep 2023 09:08), Max: 37.1 (19 Sep 2023 13:48)  T(F): 97.7 (20 Sep 2023 09:08), Max: 98.8 (19 Sep 2023 13:48)  HR: 73 (20 Sep 2023 12:00) (63 - 88)  BP: 162/84 (20 Sep 2023 12:00) (138/68 - 192/95)  BP(mean): 115 (20 Sep 2023 12:00) (95 - 135)  RR: 14 (20 Sep 2023 12:00) (14 - 16)  SpO2: 100% (20 Sep 2023 12:00) (95% - 100%)    Parameters below as of 20 Sep 2023 12:00  Patient On (Oxygen Delivery Method): ventilator    O2 Concentration (%): 30    09-19-23 @ 07:01  -  09-20-23 @ 07:00  --------------------------------------------------------  IN: 3758.6 mL / OUT: 2115 mL / NET: 1643.6 mL    09-20-23 @ 07:01  -  09-20-23 @ 12:36  --------------------------------------------------------  IN: 985 mL / OUT: 262 mL / NET: 723 mL      PHYSICAL EXAM:  Constitutional:  intubated, ill appearing, positioned 30 degrees  Eyes: HOANG  Ear/Nose/Throat: intubated, no obvious deformity	  Neck:   supple  Respiratory: CTA gala, not tachypneic, breathing with vent  Cardiovascular: S1S2 RRR, no murmurs  Gastrointestinal:soft, (+) BS, no HSM  Extremities: PIV's  Vascular: 2+ radial pulses        LABS:                        12.2   6.78  )-----------( 150      ( 20 Sep 2023 04:47 )             38.0     09-20    143  |  112<H>  |  8   ----------------------------<  101<H>  3.0<L>   |  21<L>  |  0.58    Ca    7.1<L>      20 Sep 2023 04:47  Phos  2.4     09-20  Mg     1.4     09-20    TPro  5.6<L>  /  Alb  2.3<L>  /  TBili  0.2  /  DBili  x   /  AST  13  /  ALT  8<L>  /  AlkPhos  67  09-20      Urinalysis Basic - ( 20 Sep 2023 11:28 )    Color: Yellow / Appearance: Clear / SG: <=1.005 / pH: x  Gluc: x / Ketone: NEGATIVE  / Bili: Negative / Urobili: 0.2 E.U./dL   Blood: x / Protein: NEGATIVE mg/dL / Nitrite: NEGATIVE   Leuk Esterase: NEGATIVE / RBC: 5-10 /HPF / WBC < 5 /HPF   Sq Epi: x / Non Sq Epi: x / Bacteria: Present /HPF        MICROBIOLOGY:      RADIOLOGY & ADDITIONAL STUDIES:  Reviewed

## 2023-09-20 NOTE — DIETITIAN INITIAL EVALUATION ADULT - ADD RECOMMEND
-Initiate nutrition within 24-48 hrs    *If enteral nutrition support indicated, reconsult RDN for formal recommendations   -Monitor ability to extubate as medically able    *Recommend dysphagia screen and regular diet with appropriate textures/consistencies   -Monitor pressor and propofol demands  -Align nutrition with goals of care at all times  -Monitor chemistry, GI function, and skin integrity

## 2023-09-20 NOTE — PROGRESS NOTE ADULT - ASSESSMENT
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  Would favor a viral etiology given normal WBC on presentation, lack of neutrophils in CSF, high CSF glucose and negative CSF PCR.    Recommend:  1. STOP vancomycin since PCR negative for strep pneumo  2. STOP ampicillin since PCR negative for Listeria  3. c/w Acyclovir 500 mg IV q8hrs until HSV PCR results  4. c/w CTX 2g IV q12h which can cover Lyme  5.  Add onto CSF:  [place] CSF IgM, [place] encephalitis panel  6.  f/u Lyme serologies from blood  7.  f/u HSV1/2 PCR  8.  Check RVP  9.  Check serum HIV test (4th generation)  10. 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 Would favor a viral etiology given normal WBC on presentation, lack of neutrophils in CSF, high CSF glucose and negative CSF PCR. CSF PCR negative thus far. Pending further workup to determine exact etiology.    Recommend:  1. STOP vancomycin since PCR negative for strep pneumo  2. STOP ampicillin since PCR negative for Listeria  3. c/w Acyclovir 500 mg IV q8hrs until HSV PCR results  4. c/w CTX 2g IV q12h which can cover Lyme  5.  Add onto CSF: [place] encephalitis panel  6.  f/u Lyme serologies from blood, HSV1/2 PCR, West Nile CSF IgM  8.  Check RVP  9.  Check serum HIV test (4th generation)  10. Can discontinue droplet precautions    ID team 1 will follow     Discussed with Dr. Berger (Attending). Not final until signed by Attending.

## 2023-09-20 NOTE — DIETITIAN INITIAL EVALUATION ADULT - PERTINENT MEDS FT
MEDICATIONS  (STANDING):  acyclovir IVPB 500 milliGRAM(s) IV Intermittent every 8 hours  cefTRIAXone   IVPB 2000 milliGRAM(s) IV Intermittent every 12 hours  chlorhexidine 0.12% Liquid 15 milliLiter(s) Oral Mucosa every 12 hours  chlorhexidine 2% Cloths 1 Application(s) Topical <User Schedule>  enoxaparin Injectable 30 milliGRAM(s) SubCutaneous every 24 hours  fentaNYL   Infusion. 2 MICROgram(s)/kG/Hr (9.98 mL/Hr) IV Continuous <Continuous>  lactated ringers. 1000 milliLiter(s) (80 mL/Hr) IV Continuous <Continuous>  norepinephrine Infusion 0.05 MICROgram(s)/kG/Min (4.68 mL/Hr) IV Continuous <Continuous>  pantoprazole  Injectable 40 milliGRAM(s) IV Push daily  propofol Infusion 25.017 MICROgram(s)/kG/Min (7.49 mL/Hr) IV Continuous <Continuous>  thiamine IVPB 500 milliGRAM(s) IV Intermittent every 8 hours    MEDICATIONS  (PRN):  midazolam Injectable 4 milliGRAM(s) IV Push every 12 hours PRN agitation

## 2023-09-20 NOTE — PROGRESS NOTE ADULT - ASSESSMENT
62F presents w/ AMS, elevated /106 w/ HA, and blurry vision likely 2/2 to HTN emergency improved on Nicardipine drip but continues to be altered. Patient was intubated and sedated 9/18 for LP.     Neuro  #AMS   Patient presented with thunderclap headache, blurry vision, AMS and hypertension. Differentials include Hypertensive emergency vs Pres vs meningitis. UTox negative. LP performed on 09/18, CSF protein 79, glucose 75- otherwise unremarkable.   - c/w fentanyl and propofol ggt for sedation   - blood and CSF cultures with no growth to date- per ID, d/c CTX  - c/w empiric coverage for bacterial meningitis -- Vanc 750mg q12, CTX 2g q12, Acyclovir 500 q8hr, Ampicillin 2g q4 + LR @ 80cc/hr.  - F/u CSF studies: west nile virus, coxsackievirus, echovirus, human parechovirus, HIV, EBV   - MRI to r/o PRES   - f/u vitamin B1, B6, folate, B12, vitamin D 25-OH, vitamin E, TSH, RPR, HIV    #HA  Trauma to the head 3 days prior w/o changes on CT imaging.  - SHRADDHA     CVS  #Hypertensive emergency  Treated in ED w/ 2 runs of Nicardipine drip 25mL/hr, 1 10mg Labetalol IVP, goal of 20% reduction. BP has since normalized to SBP 90s-150s.   - d/c nicardipine drip  -MRI to r/o PRES  -No EKG changes    Respiratory  - Maintain extubation until MRI obtained     Renal  No MARLENE    Endo  - f/u A1c and Lipid panel (cholesterol 210) to evaluate chronicity of HTN     ID  #SIRS (RESOLVED)  2/4 SIRS criteria (febrile, tachycardic) with possible meningitis source. CXR negative, UA negative  - f/u LP results   - c/w empiric treatment of meningitis (vanc 750mg q8hr, CTX 2g q12hr, and acyclovir 500mg q8hr)   - LR @ 80cc/hr while receiving acyclovir   - f/u blood/urine cultures      Heme  Hgb dropped 15.4 --> 12.8 --> 12.2. Potentially 2/2 dilution, although platelets stable. No overt signs of bleeding on exam.   - Trend H/H     Prophylactic  Fluids: LR @ 80cc/hr   Electrolytes: replete as needed. Repeat BMP 9/19   Nutrition: NPO   GI: pantoprazole 40mg IV daily   DVT: Lovenox 30mg subQ daily   Dispo: MICU      62F presents w/ AMS, elevated /106 w/ HA, and blurry vision likely 2/2 to HTN emergency improved on Nicardipine drip but continues to be altered. Patient was intubated and sedated 9/18 for LP.     Neuro  #AMS   Patient presented with thunderclap headache, blurry vision, AMS and hypertension. Differentials include Hypertensive emergency vs Pres vs meningitis. UTox negative. LP performed on 09/18, CSF protein 79, glucose 75- otherwise unremarkable.   - c/w fentanyl and propofol ggt for sedation   - blood and CSF cultures with no growth to date- per ID, d/c CTX and ampicillin   - c/w empiric coverage for bacterial meningitis -- Vanc 750mg q12, CTX 2g q12, Acyclovir 500 q8hr, Ampicillin 2g q4 + LR @ 80cc/hr.  - F/u CSF studies: west nile virus, coxsackievirus, echovirus, human parechovirus, HIV, EBV   - MRI to r/o PRES   - f/u vitamin B1, B6, folate, B12, vitamin D 25-OH, vitamin E, TSH, RPR, HIV    #HA  Trauma to the head 3 days prior w/o changes on CT imaging.  - SHRADDHA     CVS  #Hypertensive emergency  Treated in ED w/ 2 runs of Nicardipine drip 25mL/hr, 1 10mg Labetalol IVP, goal of 20% reduction. BP has since normalized to SBP 130s-180s   - d/c nicardipine drip  -No EKG changes    Respiratory  - Maintain intubation until MRI obtained     Renal  No MARLENE    Endo  - f/u A1c and Lipid panel (cholesterol 210) to evaluate chronicity of HTN     ID  #SIRS (RESOLVED)  2/4 SIRS criteria (febrile, tachycardic) with possible meningitis source. CXR negative, UA negative  - f/u LP results   - c/w empiric treatment of meningitis (vanc 750mg q8hr, CTX 2g q12hr, and acyclovir 500mg q8hr)   - LR @ 80cc/hr while receiving acyclovir   - f/u blood/urine cultures      Heme  Hgb dropped 15.4 --> 12.8 --> 12.2. Potentially 2/2 dilution, although platelets stable. No overt signs of bleeding on exam.   - Trend H/H     Prophylactic  Fluids: LR @ 80cc/hr   Electrolytes: replete as needed  Nutrition: NPO   GI: pantoprazole 40mg IV daily   DVT: Lovenox 30mg subQ daily   Dispo: MICU      62F presents w/ AMS, elevated /106 w/ HA, and blurry vision likely 2/2 to HTN emergency improved on Nicardipine drip but continues to be altered. Patient was intubated and sedated 9/18 for LP.     Neuro  #AMS   Patient presented with thunderclap headache, blurry vision, AMS and hypertension. Differentials include Hypertensive emergency vs Pres vs meningitis. UTox negative. LP performed on 09/18, CSF protein 79, glucose 75- otherwise unremarkable.   - c/w fentanyl and propofol ggt for sedation   - blood and CSF cultures with no growth to date  - per ID, c/w CTX 2g IV BID (for lyme coverage) and acyclovir 500mg IV q8hr with LR @ 80cc/hr (until 2 negative HSV)  - d/c vanc and ampicillin  - F/u CSF studies (anti-NMDA receptor encephalitis, [place] encephalitis panel, WNV, coxsackie, echovirus, human parechovirus, HIV, EBV)   - f/u MRI to r/o PRES   - f/u vitamin B1, B6, folate, B12, vitamin D 25-OH, vitamin E, TSH, RPR, HIV    #HA  Trauma to the head 3 days prior w/o changes on CT imaging.  - SHRADDHA     CVS  #Hypertensive emergency  Treated in ED w/ 2 runs of Nicardipine drip 25mL/hr, 1 10mg Labetalol IVP, goal of 20% reduction. BP has since normalized to SBP 130s-180s   - d/c nicardipine drip  - No EKG changes    #LUE edema  New LUE edema 9/20 am  - Arterial doppler LUE   - f/u lactate    Respiratory  - Maintain intubation until MRI obtained     Renal  UA on admission with >300 protein.  - Repeat UA with lytes  - 24hr urine protein study  - f/u SPEP/UPEP    Endo  - f/u A1c and Lipid panel (cholesterol 210) to evaluate chronicity of HTN     ID  #SIRS (RESOLVED)  2/4 SIRS criteria (febrile, tachycardic) with possible meningitis source. CXR negative, UA negative  - f/u LP results   - c/w CTX 2g IV BID (for lyme coverage) and acyclovir 500mg IV q8hr with LR @ 80cc/hr (until 2 negative HSV)  - f/u blood/urine cultures      Heme  Hgb dropped 15.4 --> 12.8 --> 12.2. Potentially 2/2 dilution. No overt signs of bleeding on exam.   - Trend H/H     Prophylactic  Fluids: LR @ 80cc/hr   Electrolytes: replete as needed  Nutrition: NPO   GI: pantoprazole 40mg IV daily   DVT: Lovenox 30mg subQ daily   Dispo: MICU

## 2023-09-20 NOTE — DIETITIAN INITIAL EVALUATION ADULT - OTHER CALCULATIONS
Estimated nutritional needs determined using [facility] Standards of Nutrition Care for vented pt.

## 2023-09-20 NOTE — PROGRESS NOTE ADULT - SUBJECTIVE AND OBJECTIVE BOX
INTERVAL HPI/OVERNIGHT EVENTS: Discontinued ampicillin (ID recs) and decadron. Vancomycin continued due to low trough level. K of 3.0 in AM, repleted.     SUBJECTIVE: Patient seen and examined at bedside. Patient is intubated and sedated.     VITAL SIGNS:  ICU Vital Signs Last 24 Hrs  T(C): 36.1 (20 Sep 2023 01:02), Max: 37.4 (19 Sep 2023 09:01)  T(F): 97 (20 Sep 2023 01:02), Max: 99.3 (19 Sep 2023 09:01)  HR: 68 (20 Sep 2023 05:00) (63 - 94)  BP: 159/82 (20 Sep 2023 05:00) (97/58 - 185/86)  BP(mean): 114 (20 Sep 2023 05:00) (72 - 128)  ABP: --  ABP(mean): --  RR: 14 (20 Sep 2023 05:00) (14 - 35)  SpO2: 100% (20 Sep 2023 05:00) (95% - 100%)    O2 Parameters below as of 20 Sep 2023 05:00  Patient On (Oxygen Delivery Method): ventilator    O2 Concentration (%): 30    Mode: AC/ CMV (Assist Control/ Continuous Mandatory Ventilation)  RR (machine): 14  TV (machine): 350  FiO2: 30  PEEP: 5  ITime: 1  MAP: 9.4  PIP: 23    I&O's Detail    18 Sep 2023 07:01  -  19 Sep 2023 07:00  --------------------------------------------------------  IN:    FentaNYL: 70 mL    IV PiggyBack: 100 mL    Lactated Ringers: 800 mL    Midazolam: 72 mL    NiCARdipine: 25 mL    NiCARdipine: 18.6 mL  Total IN: 1085.6 mL    OUT:    Indwelling Catheter - Urethral (mL): 650 mL    Voided (mL): 100 mL  Total OUT: 750 mL    Total NET: 335.6 mL      19 Sep 2023 07:01  -  20 Sep 2023 05:30  --------------------------------------------------------  IN:    FentaNYL: 10 mL    FentaNYL: 45 mL    FentaNYL: 25 mL    IV PiggyBack: 1500 mL    Lactated Ringers: 1040 mL    Lactated Ringers: 800 mL    Midazolam: 6 mL    Norepinephrine: 2.8 mL    Norepinephrine: 42.3 mL    Propofol: 40.5 mL    Propofol: 147 mL  Total IN: 3658.6 mL    OUT:    Indwelling Catheter - Urethral (mL): 1890 mL    NiCARdipine: 0 mL  Total OUT: 1890 mL    Total NET: 1768.6 mL      PHYSICAL EXAM:    GENERAL: lying in bed, intubated and sedated   HEAD:  Atraumatic, normocephalic  EYES: pupils constricted bilaterally, round and equally reactive; conjunctiva and sclera clear  NECK: No neck stiffness. Supple, trachea midline, no JVD  HEART: Regular rate and rhythm, no murmurs, rubs, or gallops  LUNGS: Intubated. Unlabored respirations. Clear to auscultation bilaterally, no crackles, wheezing, or rhonchi  ABDOMEN: Soft, nontender, nondistended, +BS  EXTREMITIES: 2+ peripheral pulses bilaterally. No clubbing, cyanosis, or edema  NERVOUS SYSTEM: Sedated, non-responsive, no gross focal deficits   SKIN: No rashes or lesions    MEDICATIONS:  MEDICATIONS  (STANDING):  acyclovir IVPB 500 milliGRAM(s) IV Intermittent every 8 hours  ampicillin  IVPB 2 Gram(s) IV Intermittent every 4 hours  cefTRIAXone   IVPB 2000 milliGRAM(s) IV Intermittent every 12 hours  chlorhexidine 0.12% Liquid 15 milliLiter(s) Oral Mucosa every 12 hours  dexAMETHasone  IVPB 7.5 milliGRAM(s) IV Intermittent every 6 hours  dexMEDEtomidine Infusion 0.5 MICROgram(s)/kG/Hr (6.24 mL/Hr) IV Continuous <Continuous>  enoxaparin Injectable 30 milliGRAM(s) SubCutaneous every 24 hours  fentaNYL   Infusion... 0.5 MICROgram(s)/kG/Hr (1.25 mL/Hr) IV Continuous <Continuous>  lactated ringers. 1000 milliLiter(s) (80 mL/Hr) IV Continuous <Continuous>  midazolam Infusion 0.02 mG/kG/Hr (1 mL/Hr) IV Continuous <Continuous>  niCARdipine Infusion 5 mG/Hr (25 mL/Hr) IV Continuous <Continuous>  norepinephrine Infusion 0.05 MICROgram(s)/kG/Min (4.68 mL/Hr) IV Continuous <Continuous>  potassium chloride  10 mEq/100 mL IVPB 10 milliEquivalent(s) IV Intermittent every 1 hour  thiamine IVPB 500 milliGRAM(s) IV Intermittent every 8 hours  vancomycin  IVPB 750 milliGRAM(s) IV Intermittent every 12 hours    MEDICATIONS  (PRN):      ALLERGIES:  Allergies    No Known Allergies    Intolerances        LABS:                        12.2   6.78  )-----------( 150      ( 20 Sep 2023 04:47 )             38.0     09-20    143  |  112<H>  |  8   ----------------------------<  101<H>  3.0<L>   |  21<L>  |  0.58    Ca    7.1<L>      20 Sep 2023 04:47  Phos  2.4     09-20  Mg     1.4     09-20    TPro  5.6<L>  /  Alb  2.3<L>  /  TBili  0.2  /  DBili  x   /  AST  13  /  ALT  8<L>  /  AlkPhos  67  09-20      Urinalysis Basic - ( 20 Sep 2023 04:47 )    Color: x / Appearance: x / SG: x / pH: x  Gluc: 101 mg/dL / Ketone: x  / Bili: x / Urobili: x   Blood: x / Protein: x / Nitrite: x   Leuk Esterase: x / RBC: x / WBC x   Sq Epi: x / Non Sq Epi: x / Bacteria: x      RADIOLOGY, EKG & ADDITIONAL TESTS: Reviewed.                     RADIOLOGY & ADDITIONAL TESTS: Reviewed. INTERVAL HPI/OVERNIGHT EVENTS: Discontinued ampicillin (ID recs) and decadron. Vancomycin continued due to low trough level. K of 3.0 in AM, repleted.     SUBJECTIVE: Patient seen and examined at bedside. Patient is intubated and sedated.     VITAL SIGNS:  ICU Vital Signs Last 24 Hrs  T(C): 36.1 (20 Sep 2023 01:02), Max: 37.4 (19 Sep 2023 09:01)  T(F): 97 (20 Sep 2023 01:02), Max: 99.3 (19 Sep 2023 09:01)  HR: 68 (20 Sep 2023 05:00) (63 - 94)  BP: 159/82 (20 Sep 2023 05:00) (97/58 - 185/86)  BP(mean): 114 (20 Sep 2023 05:00) (72 - 128)  ABP: --  ABP(mean): --  RR: 14 (20 Sep 2023 05:00) (14 - 35)  SpO2: 100% (20 Sep 2023 05:00) (95% - 100%)    O2 Parameters below as of 20 Sep 2023 05:00  Patient On (Oxygen Delivery Method): ventilator    O2 Concentration (%): 30    Mode: AC/ CMV (Assist Control/ Continuous Mandatory Ventilation)  RR (machine): 14  TV (machine): 350  FiO2: 30  PEEP: 5  ITime: 1  MAP: 9.4  PIP: 23    I&O's Detail    18 Sep 2023 07:01  -  19 Sep 2023 07:00  --------------------------------------------------------  IN:    FentaNYL: 70 mL    IV PiggyBack: 100 mL    Lactated Ringers: 800 mL    Midazolam: 72 mL    NiCARdipine: 25 mL    NiCARdipine: 18.6 mL  Total IN: 1085.6 mL    OUT:    Indwelling Catheter - Urethral (mL): 650 mL    Voided (mL): 100 mL  Total OUT: 750 mL    Total NET: 335.6 mL      19 Sep 2023 07:01  -  20 Sep 2023 05:30  --------------------------------------------------------  IN:    FentaNYL: 10 mL    FentaNYL: 45 mL    FentaNYL: 25 mL    IV PiggyBack: 1500 mL    Lactated Ringers: 1040 mL    Lactated Ringers: 800 mL    Midazolam: 6 mL    Norepinephrine: 2.8 mL    Norepinephrine: 42.3 mL    Propofol: 40.5 mL    Propofol: 147 mL  Total IN: 3658.6 mL    OUT:    Indwelling Catheter - Urethral (mL): 1890 mL    NiCARdipine: 0 mL  Total OUT: 1890 mL    Total NET: 1768.6 mL      PHYSICAL EXAM:  GENERAL: lying in bed, intubated and sedated   HEAD:  Atraumatic, normocephalic  EYES: pupils constricted bilaterally, round and equally reactive; conjunctiva and sclera clear  NECK: Supple, trachea midline, no JVD  HEART: Regular rate and rhythm, no murmurs, rubs, or gallops  LUNGS: Intubated. Unlabored respirations. Clear to auscultation bilaterally, no crackles, wheezing, or rhonchi  ABDOMEN: Soft, nontender, nondistended, +BS  EXTREMITIES: 2+ peripheral pulses bilaterally. No clubbing, cyanosis, or edema  NERVOUS SYSTEM: Sedated, non-responsive, withdraws to pain, no gross focal deficits   SKIN: No rashes or lesions    MEDICATIONS:  MEDICATIONS  (STANDING):  acyclovir IVPB 500 milliGRAM(s) IV Intermittent every 8 hours  ampicillin  IVPB 2 Gram(s) IV Intermittent every 4 hours  cefTRIAXone   IVPB 2000 milliGRAM(s) IV Intermittent every 12 hours  chlorhexidine 0.12% Liquid 15 milliLiter(s) Oral Mucosa every 12 hours  dexAMETHasone  IVPB 7.5 milliGRAM(s) IV Intermittent every 6 hours  dexMEDEtomidine Infusion 0.5 MICROgram(s)/kG/Hr (6.24 mL/Hr) IV Continuous <Continuous>  enoxaparin Injectable 30 milliGRAM(s) SubCutaneous every 24 hours  fentaNYL   Infusion... 0.5 MICROgram(s)/kG/Hr (1.25 mL/Hr) IV Continuous <Continuous>  lactated ringers. 1000 milliLiter(s) (80 mL/Hr) IV Continuous <Continuous>  midazolam Infusion 0.02 mG/kG/Hr (1 mL/Hr) IV Continuous <Continuous>  niCARdipine Infusion 5 mG/Hr (25 mL/Hr) IV Continuous <Continuous>  norepinephrine Infusion 0.05 MICROgram(s)/kG/Min (4.68 mL/Hr) IV Continuous <Continuous>  potassium chloride  10 mEq/100 mL IVPB 10 milliEquivalent(s) IV Intermittent every 1 hour  thiamine IVPB 500 milliGRAM(s) IV Intermittent every 8 hours  vancomycin  IVPB 750 milliGRAM(s) IV Intermittent every 12 hours    MEDICATIONS  (PRN):      ALLERGIES:  Allergies    No Known Allergies    Intolerances        LABS:                        12.2   6.78  )-----------( 150      ( 20 Sep 2023 04:47 )             38.0     09-20    143  |  112<H>  |  8   ----------------------------<  101<H>  3.0<L>   |  21<L>  |  0.58    Ca    7.1<L>      20 Sep 2023 04:47  Phos  2.4     09-20  Mg     1.4     09-20    TPro  5.6<L>  /  Alb  2.3<L>  /  TBili  0.2  /  DBili  x   /  AST  13  /  ALT  8<L>  /  AlkPhos  67  09-20      Urinalysis Basic - ( 20 Sep 2023 04:47 )    Color: x / Appearance: x / SG: x / pH: x  Gluc: 101 mg/dL / Ketone: x  / Bili: x / Urobili: x   Blood: x / Protein: x / Nitrite: x   Leuk Esterase: x / RBC: x / WBC x   Sq Epi: x / Non Sq Epi: x / Bacteria: x      RADIOLOGY, EKG & ADDITIONAL TESTS: Reviewed.                     RADIOLOGY & ADDITIONAL TESTS: Reviewed. INTERVAL HPI/OVERNIGHT EVENTS: Discontinued ampicillin (ID recs) and decadron. Vancomycin continued due to low trough level. K of 3.0 in AM, repleted.     SUBJECTIVE: Patient seen and examined at bedside. Patient is intubated and sedated.     VITAL SIGNS:  ICU Vital Signs Last 24 Hrs  T(C): 36.1 (20 Sep 2023 01:02), Max: 37.4 (19 Sep 2023 09:01)  T(F): 97 (20 Sep 2023 01:02), Max: 99.3 (19 Sep 2023 09:01)  HR: 68 (20 Sep 2023 05:00) (63 - 94)  BP: 159/82 (20 Sep 2023 05:00) (97/58 - 185/86)  BP(mean): 114 (20 Sep 2023 05:00) (72 - 128)  ABP: --  ABP(mean): --  RR: 14 (20 Sep 2023 05:00) (14 - 35)  SpO2: 100% (20 Sep 2023 05:00) (95% - 100%)    O2 Parameters below as of 20 Sep 2023 05:00  Patient On (Oxygen Delivery Method): ventilator    O2 Concentration (%): 30    Mode: AC/ CMV (Assist Control/ Continuous Mandatory Ventilation)  RR (machine): 14  TV (machine): 350  FiO2: 30  PEEP: 5  ITime: 1  MAP: 9.4  PIP: 23    I&O's Detail    18 Sep 2023 07:01  -  19 Sep 2023 07:00  --------------------------------------------------------  IN:    FentaNYL: 70 mL    IV PiggyBack: 100 mL    Lactated Ringers: 800 mL    Midazolam: 72 mL    NiCARdipine: 25 mL    NiCARdipine: 18.6 mL  Total IN: 1085.6 mL    OUT:    Indwelling Catheter - Urethral (mL): 650 mL    Voided (mL): 100 mL  Total OUT: 750 mL    Total NET: 335.6 mL      19 Sep 2023 07:01  -  20 Sep 2023 05:30  --------------------------------------------------------  IN:    FentaNYL: 10 mL    FentaNYL: 45 mL    FentaNYL: 25 mL    IV PiggyBack: 1500 mL    Lactated Ringers: 1040 mL    Lactated Ringers: 800 mL    Midazolam: 6 mL    Norepinephrine: 2.8 mL    Norepinephrine: 42.3 mL    Propofol: 40.5 mL    Propofol: 147 mL  Total IN: 3658.6 mL    OUT:    Indwelling Catheter - Urethral (mL): 1890 mL    NiCARdipine: 0 mL  Total OUT: 1890 mL    Total NET: 1768.6 mL      PHYSICAL EXAM:  GENERAL: lying in bed, intubated and sedated   HEAD:  Atraumatic, normocephalic  EYES: pupils constricted bilaterally, round and equally reactive; conjunctiva and sclera clear  NECK: Supple, trachea midline, no JVD  HEART: Regular rate and rhythm, no murmurs, rubs, or gallops  LUNGS: Intubated. Unlabored respirations. Clear to auscultation bilaterally, no crackles, wheezing, or rhonchi  ABDOMEN: Soft, nontender, nondistended, +BS  EXTREMITIES: 2+ peripheral pulses bilaterally. LUE edema. No clubbing, cyanosis, or edema in other extremities   NERVOUS SYSTEM: Sedated, non-responsive, withdraws to pain, no gross focal deficits   SKIN: No rashes or lesions    MEDICATIONS:  MEDICATIONS  (STANDING):  acyclovir IVPB 500 milliGRAM(s) IV Intermittent every 8 hours  ampicillin  IVPB 2 Gram(s) IV Intermittent every 4 hours  cefTRIAXone   IVPB 2000 milliGRAM(s) IV Intermittent every 12 hours  chlorhexidine 0.12% Liquid 15 milliLiter(s) Oral Mucosa every 12 hours  dexAMETHasone  IVPB 7.5 milliGRAM(s) IV Intermittent every 6 hours  dexMEDEtomidine Infusion 0.5 MICROgram(s)/kG/Hr (6.24 mL/Hr) IV Continuous <Continuous>  enoxaparin Injectable 30 milliGRAM(s) SubCutaneous every 24 hours  fentaNYL   Infusion... 0.5 MICROgram(s)/kG/Hr (1.25 mL/Hr) IV Continuous <Continuous>  lactated ringers. 1000 milliLiter(s) (80 mL/Hr) IV Continuous <Continuous>  midazolam Infusion 0.02 mG/kG/Hr (1 mL/Hr) IV Continuous <Continuous>  niCARdipine Infusion 5 mG/Hr (25 mL/Hr) IV Continuous <Continuous>  norepinephrine Infusion 0.05 MICROgram(s)/kG/Min (4.68 mL/Hr) IV Continuous <Continuous>  potassium chloride  10 mEq/100 mL IVPB 10 milliEquivalent(s) IV Intermittent every 1 hour  thiamine IVPB 500 milliGRAM(s) IV Intermittent every 8 hours  vancomycin  IVPB 750 milliGRAM(s) IV Intermittent every 12 hours    MEDICATIONS  (PRN):      ALLERGIES:  Allergies    No Known Allergies    Intolerances        LABS:                        12.2   6.78  )-----------( 150      ( 20 Sep 2023 04:47 )             38.0     09-20    143  |  112<H>  |  8   ----------------------------<  101<H>  3.0<L>   |  21<L>  |  0.58    Ca    7.1<L>      20 Sep 2023 04:47  Phos  2.4     09-20  Mg     1.4     09-20    TPro  5.6<L>  /  Alb  2.3<L>  /  TBili  0.2  /  DBili  x   /  AST  13  /  ALT  8<L>  /  AlkPhos  67  09-20      Urinalysis Basic - ( 20 Sep 2023 04:47 )    Color: x / Appearance: x / SG: x / pH: x  Gluc: 101 mg/dL / Ketone: x  / Bili: x / Urobili: x   Blood: x / Protein: x / Nitrite: x   Leuk Esterase: x / RBC: x / WBC x   Sq Epi: x / Non Sq Epi: x / Bacteria: x      RADIOLOGY, EKG & ADDITIONAL TESTS: Reviewed.                     RADIOLOGY & ADDITIONAL TESTS: Reviewed.

## 2023-09-21 DIAGNOSIS — F10.10 ALCOHOL ABUSE, UNCOMPLICATED: ICD-10-CM

## 2023-09-21 DIAGNOSIS — Z29.9 ENCOUNTER FOR PROPHYLACTIC MEASURES, UNSPECIFIED: ICD-10-CM

## 2023-09-21 DIAGNOSIS — R60.0 LOCALIZED EDEMA: ICD-10-CM

## 2023-09-21 DIAGNOSIS — I10 ESSENTIAL (PRIMARY) HYPERTENSION: ICD-10-CM

## 2023-09-21 DIAGNOSIS — R41.82 ALTERED MENTAL STATUS, UNSPECIFIED: ICD-10-CM

## 2023-09-21 LAB
ALBUMIN SERPL ELPH-MCNC: 2.9 G/DL — LOW (ref 3.3–5)
ALP SERPL-CCNC: 91 U/L — SIGNIFICANT CHANGE UP (ref 40–120)
ALT FLD-CCNC: 12 U/L — SIGNIFICANT CHANGE UP (ref 10–45)
ANION GAP SERPL CALC-SCNC: 7 MMOL/L — SIGNIFICANT CHANGE UP (ref 5–17)
ANISOCYTOSIS BLD QL: SLIGHT — SIGNIFICANT CHANGE UP
AST SERPL-CCNC: 16 U/L — SIGNIFICANT CHANGE UP (ref 10–40)
BASOPHILS # BLD AUTO: 0 K/UL — SIGNIFICANT CHANGE UP (ref 0–0.2)
BASOPHILS NFR BLD AUTO: 0 % — SIGNIFICANT CHANGE UP (ref 0–2)
BILIRUB SERPL-MCNC: 0.4 MG/DL — SIGNIFICANT CHANGE UP (ref 0.2–1.2)
BUN SERPL-MCNC: 8 MG/DL — SIGNIFICANT CHANGE UP (ref 7–23)
CALCIUM SERPL-MCNC: 8.7 MG/DL — SIGNIFICANT CHANGE UP (ref 8.4–10.5)
CHLORIDE SERPL-SCNC: 106 MMOL/L — SIGNIFICANT CHANGE UP (ref 96–108)
CO2 SERPL-SCNC: 26 MMOL/L — SIGNIFICANT CHANGE UP (ref 22–31)
COLLECT DURATION TIME UR: 24 HR — SIGNIFICANT CHANGE UP
CREAT SERPL-MCNC: 0.57 MG/DL — SIGNIFICANT CHANGE UP (ref 0.5–1.3)
EBV PCR: SIGNIFICANT CHANGE UP IU/ML
EGFR: 101 ML/MIN/1.73M2 — SIGNIFICANT CHANGE UP
EOSINOPHIL # BLD AUTO: 0 K/UL — SIGNIFICANT CHANGE UP (ref 0–0.5)
EOSINOPHIL NFR BLD AUTO: 0 % — SIGNIFICANT CHANGE UP (ref 0–6)
GIANT PLATELETS BLD QL SMEAR: PRESENT — SIGNIFICANT CHANGE UP
GLUCOSE SERPL-MCNC: 131 MG/DL — HIGH (ref 70–99)
HCT VFR BLD CALC: 40.5 % — SIGNIFICANT CHANGE UP (ref 34.5–45)
HGB BLD-MCNC: 13 G/DL — SIGNIFICANT CHANGE UP (ref 11.5–15.5)
HYPOCHROMIA BLD QL: SLIGHT — SIGNIFICANT CHANGE UP
JCPYV DNA # CSF NAA+PROBE: SIGNIFICANT CHANGE UP COPIES/ML
LYMPHOCYTES # BLD AUTO: 1.94 K/UL — SIGNIFICANT CHANGE UP (ref 1–3.3)
LYMPHOCYTES # BLD AUTO: 20.2 % — SIGNIFICANT CHANGE UP (ref 13–44)
MACROCYTES BLD QL: SLIGHT — SIGNIFICANT CHANGE UP
MAGNESIUM SERPL-MCNC: 1.9 MG/DL — SIGNIFICANT CHANGE UP (ref 1.6–2.6)
MANUAL SMEAR VERIFICATION: SIGNIFICANT CHANGE UP
MCHC RBC-ENTMCNC: 25.9 PG — LOW (ref 27–34)
MCHC RBC-ENTMCNC: 32.1 GM/DL — SIGNIFICANT CHANGE UP (ref 32–36)
MCV RBC AUTO: 80.7 FL — SIGNIFICANT CHANGE UP (ref 80–100)
MICROCYTES BLD QL: SLIGHT — SIGNIFICANT CHANGE UP
MONOCYTES # BLD AUTO: 0.5 K/UL — SIGNIFICANT CHANGE UP (ref 0–0.9)
MONOCYTES NFR BLD AUTO: 5.2 % — SIGNIFICANT CHANGE UP (ref 2–14)
NEUTROPHILS # BLD AUTO: 7.06 K/UL — SIGNIFICANT CHANGE UP (ref 1.8–7.4)
NEUTROPHILS NFR BLD AUTO: 73.7 % — SIGNIFICANT CHANGE UP (ref 43–77)
OVALOCYTES BLD QL SMEAR: SLIGHT — SIGNIFICANT CHANGE UP
PHOSPHATE SERPL-MCNC: 2.3 MG/DL — LOW (ref 2.5–4.5)
PLAT MORPH BLD: ABNORMAL
PLATELET # BLD AUTO: 169 K/UL — SIGNIFICANT CHANGE UP (ref 150–400)
POIKILOCYTOSIS BLD QL AUTO: SLIGHT — SIGNIFICANT CHANGE UP
POTASSIUM SERPL-MCNC: 3.1 MMOL/L — LOW (ref 3.5–5.3)
POTASSIUM SERPL-SCNC: 3.1 MMOL/L — LOW (ref 3.5–5.3)
PROT 24H UR-MRATE: 240 MG/24 H — HIGH (ref 50–100)
PROT SERPL-MCNC: 7.1 G/DL — SIGNIFICANT CHANGE UP (ref 6–8.3)
RBC # BLD: 5.02 M/UL — SIGNIFICANT CHANGE UP (ref 3.8–5.2)
RBC # FLD: 18.5 % — HIGH (ref 10.3–14.5)
RBC BLD AUTO: ABNORMAL
SMUDGE CELLS # BLD: PRESENT — SIGNIFICANT CHANGE UP
SODIUM SERPL-SCNC: 139 MMOL/L — SIGNIFICANT CHANGE UP (ref 135–145)
TOTAL VOLUME - 24 HOUR: 2400 ML — SIGNIFICANT CHANGE UP
URINE CREATININE CALCULATION: 0.6 G/24 H — LOW (ref 0.8–1.8)
VARIANT LYMPHS # BLD: 0.9 % — SIGNIFICANT CHANGE UP (ref 0–6)
WBC # BLD: 9.58 K/UL — SIGNIFICANT CHANGE UP (ref 3.8–10.5)
WBC # FLD AUTO: 9.58 K/UL — SIGNIFICANT CHANGE UP (ref 3.8–10.5)

## 2023-09-21 PROCEDURE — 99233 SBSQ HOSP IP/OBS HIGH 50: CPT

## 2023-09-21 PROCEDURE — 95720 EEG PHY/QHP EA INCR W/VEEG: CPT

## 2023-09-21 PROCEDURE — 99232 SBSQ HOSP IP/OBS MODERATE 35: CPT

## 2023-09-21 RX ORDER — TRIAMTERENE/HYDROCHLOROTHIAZID 75 MG-50MG
1 TABLET ORAL DAILY
Refills: 0 | Status: DISCONTINUED | OUTPATIENT
Start: 2023-09-21 | End: 2023-09-23

## 2023-09-21 RX ORDER — METOPROLOL TARTRATE 50 MG
100 TABLET ORAL EVERY 24 HOURS
Refills: 0 | Status: DISCONTINUED | OUTPATIENT
Start: 2023-09-21 | End: 2023-09-23

## 2023-09-21 RX ORDER — LABETALOL HCL 100 MG
10 TABLET ORAL ONCE
Refills: 0 | Status: COMPLETED | OUTPATIENT
Start: 2023-09-21 | End: 2023-09-21

## 2023-09-21 RX ORDER — POTASSIUM CHLORIDE 20 MEQ
40 PACKET (EA) ORAL EVERY 4 HOURS
Refills: 0 | Status: COMPLETED | OUTPATIENT
Start: 2023-09-21 | End: 2023-09-21

## 2023-09-21 RX ADMIN — Medication 100 MILLIGRAM(S): at 19:47

## 2023-09-21 RX ADMIN — CEFTRIAXONE 100 MILLIGRAM(S): 500 INJECTION, POWDER, FOR SOLUTION INTRAMUSCULAR; INTRAVENOUS at 17:24

## 2023-09-21 RX ADMIN — PANTOPRAZOLE SODIUM 40 MILLIGRAM(S): 20 TABLET, DELAYED RELEASE ORAL at 12:39

## 2023-09-21 RX ADMIN — Medication 10 MILLIGRAM(S): at 16:25

## 2023-09-21 RX ADMIN — Medication 40 MILLIEQUIVALENT(S): at 15:27

## 2023-09-21 RX ADMIN — NICARDIPINE HYDROCHLORIDE 25 MG/HR: 30 CAPSULE, EXTENDED RELEASE ORAL at 05:37

## 2023-09-21 RX ADMIN — Medication 40 MILLIEQUIVALENT(S): at 17:24

## 2023-09-21 RX ADMIN — Medication 105 MILLIGRAM(S): at 08:34

## 2023-09-21 RX ADMIN — Medication 105 MILLIGRAM(S): at 16:07

## 2023-09-21 RX ADMIN — Medication 105 MILLIGRAM(S): at 00:33

## 2023-09-21 RX ADMIN — ENOXAPARIN SODIUM 30 MILLIGRAM(S): 100 INJECTION SUBCUTANEOUS at 07:44

## 2023-09-21 RX ADMIN — DEXMEDETOMIDINE HYDROCHLORIDE IN 0.9% SODIUM CHLORIDE 1.25 MICROGRAM(S)/KG/HR: 4 INJECTION INTRAVENOUS at 04:11

## 2023-09-21 RX ADMIN — CHLORHEXIDINE GLUCONATE 1 APPLICATION(S): 213 SOLUTION TOPICAL at 05:36

## 2023-09-21 RX ADMIN — Medication 1 TABLET(S): at 10:15

## 2023-09-21 RX ADMIN — CEFTRIAXONE 100 MILLIGRAM(S): 500 INJECTION, POWDER, FOR SOLUTION INTRAMUSCULAR; INTRAVENOUS at 05:33

## 2023-09-21 NOTE — PROGRESS NOTE ADULT - ATTENDING COMMENTS
62 year old female presented with severe hypertension and altered mental status found to be febrile now found on MRI to have ovoid areas of T2 flair in bilateral cerebellar hemispheres consistent with PRES versus toxic metabolic. Will discuss results with neurology. Wean nicardipine gtt and transition to oral antihypertensives.    In as far as the fever work up she was initially intubated for LP and MRI and empirically started on coverage for bacterial meningitis and herpes encephalitis. ID and neuro were consulted. The CSF studies were not consistent with bacterial meningitis and was HSV negative and so vancomycin, ampicillin, and acyclovir were discontinued. We will continue CTX until confirmed lyme negative. she has been extubated but has been requiring some precedex for agitation which we are weaning. We will obtain EEG and plan for downgrade from MICU when able to transition off nicardpine and precedex.    Hypokalemic this AM. Will replete.

## 2023-09-21 NOTE — PROGRESS NOTE ADULT - SUBJECTIVE AND OBJECTIVE BOX
INTERVAL HPI/OVERNIGHT EVENTS:  Patient seen and examined. She is extubated and only on low precedex. Patient states that she was having headache for past few days prior to arrival to ED and she was also very stressed and tired.     MEDICATIONS  (STANDING):  cefTRIAXone   IVPB 2000 milliGRAM(s) IV Intermittent every 12 hours  chlorhexidine 2% Cloths 1 Application(s) Topical <User Schedule>  dexMEDEtomidine Infusion 0.1 MICROgram(s)/kG/Hr (1.25 mL/Hr) IV Continuous <Continuous>  enoxaparin Injectable 30 milliGRAM(s) SubCutaneous every 24 hours  niCARdipine Infusion 5 mG/Hr (25 mL/Hr) IV Continuous <Continuous>  pantoprazole  Injectable 40 milliGRAM(s) IV Push daily  thiamine IVPB 500 milliGRAM(s) IV Intermittent every 8 hours  triamterene 37.5 mG/hydrochlorothiazide 25 mG Tablet 1 Tablet(s) Oral daily    MEDICATIONS  (PRN):      Allergies    No Known Allergies    Intolerances        Vital Signs Last 24 Hrs  T(C): 36.1 (21 Sep 2023 09:35), Max: 36.7 (21 Sep 2023 01:02)  T(F): 97 (21 Sep 2023 09:35), Max: 98.1 (21 Sep 2023 01:02)  HR: 67 (21 Sep 2023 14:00) (58 - 104)  BP: 149/72 (21 Sep 2023 14:00) (116/55 - 179/134)  BP(mean): 103 (21 Sep 2023 14:00) (76 - 146)  RR: 21 (21 Sep 2023 14:00) (13 - 40)  SpO2: 99% (21 Sep 2023 14:00) (92% - 100%)    Parameters below as of 21 Sep 2023 14:00  Patient On (Oxygen Delivery Method): room air        Physical exam:  Neurologic:  -Mental status: Awake, alert, oriented to person, place, and time. Speech is fluent with intact naming, repetition, and comprehension, no dysarthria. Follows commands. Attention/concentration intact.   -Cranial nerves:   II: Visual fields are full to confrontation.  III, IV, VI: Extraocular movements are intact without nystagmus. Pupils equally round and reactive to light  V:  Facial sensation V1-V3 equal and intact   VII: Face is symmetric with normal eye closure and smile  Motor: Normal bulk and tone. No pronator drift. Strength bilateral upper extremity 5/5, bilateral lower extremities 5/5.  Rapid alternating movements intact and symmetric  Sensation: Intact to light touch bilaterally. No neglect or extinction on double simultaneous testing.  Coordination: No dysmetria on finger-to-nose  bilaterally  Reflexes: Downgoing toes bilaterally           LABS:                        13.0   9.58  )-----------( 169      ( 21 Sep 2023 05:20 )             40.5     09-21    139  |  106  |  8   ----------------------------<  131<H>  3.1<L>   |  26  |  0.57    Ca    8.7      21 Sep 2023 05:20  Phos  2.3     09-21  Mg     1.9     09-21    TPro  7.1  /  Alb  2.9<L>  /  TBili  0.4  /  DBili  x   /  AST  16  /  ALT  12  /  AlkPhos  91  09-21      Urinalysis Basic - ( 21 Sep 2023 05:20 )    Color: x / Appearance: x / SG: x / pH: x  Gluc: 131 mg/dL / Ketone: x  / Bili: x / Urobili: x   Blood: x / Protein: x / Nitrite: x   Leuk Esterase: x / RBC: x / WBC x   Sq Epi: x / Non Sq Epi: x / Bacteria: x        RADIOLOGY & ADDITIONAL TESTS:  < from: MR Head w/wo IV Cont (09.20.23 @ 16:25) >  Ovoid areas of signal abnormality in the bilateral cerebellar   hemispheres. This may represent atypical PRES versus toxic metabolic   etiology. Infection is less likely given lack of restricted diffusion or   enhancement.    No acute infarction, mass effect or pathologic enhancement.      Video EEG:    Events:  1)	No electrographic seizures or significant clinical events occurred during this study.  Provocations:  •	Hyperventilation: was not performed.  •	Photic stimulation: was not performed.  Daily Summary:    1)	There was mild diffuse slowing of background activity  2)	There were no findings of active epilepsy, however this alone does not rule out the diagnosis.          INTERVAL HPI/ OVERNIGHT EVENTS:  Patient seen and examined. She is extubated and only on low precedex. Patient states that she was having headache for past few days prior to arrival to ED and she was also very stressed and tired.     MEDICATIONS  (STANDING):  cefTRIAXone   IVPB 2000 milliGRAM(s) IV Intermittent every 12 hours  chlorhexidine 2% Cloths 1 Application(s) Topical <User Schedule>  dexMEDEtomidine Infusion 0.1 MICROgram(s)/kG/Hr (1.25 mL/Hr) IV Continuous <Continuous>  enoxaparin Injectable 30 milliGRAM(s) SubCutaneous every 24 hours  niCARdipine Infusion 5 mG/Hr (25 mL/Hr) IV Continuous <Continuous>  pantoprazole  Injectable 40 milliGRAM(s) IV Push daily  thiamine IVPB 500 milliGRAM(s) IV Intermittent every 8 hours  triamterene 37.5 mG/hydrochlorothiazide 25 mG Tablet 1 Tablet(s) Oral daily    MEDICATIONS  (PRN):      Allergies    No Known Allergies    Intolerances        Vital Signs Last 24 Hrs  T(C): 36.1 (21 Sep 2023 09:35), Max: 36.7 (21 Sep 2023 01:02)  T(F): 97 (21 Sep 2023 09:35), Max: 98.1 (21 Sep 2023 01:02)  HR: 67 (21 Sep 2023 14:00) (58 - 104)  BP: 149/72 (21 Sep 2023 14:00) (116/55 - 179/134)  BP(mean): 103 (21 Sep 2023 14:00) (76 - 146)  RR: 21 (21 Sep 2023 14:00) (13 - 40)  SpO2: 99% (21 Sep 2023 14:00) (92% - 100%)    Parameters below as of 21 Sep 2023 14:00  Patient On (Oxygen Delivery Method): room air        Physical exam:  Neurologic:  -Mental status: Awake, alert, oriented to person, place, and time. Speech is fluent with intact naming, repetition, and comprehension, no dysarthria. Follows commands. Attention/concentration intact.   -Cranial nerves:   II: Visual fields are full to confrontation.  III, IV, VI: Extraocular movements are intact without nystagmus. Pupils equally round and reactive to light  V:  Facial sensation V1-V3 equal and intact   VII: Face is symmetric with normal eye closure and smile  Motor: Normal bulk and tone. No pronator drift. Strength bilateral upper extremity 5/5, bilateral lower extremities 5/5.  Rapid alternating movements intact and symmetric  Sensation: Intact to light touch bilaterally. No neglect or extinction on double simultaneous testing.  Coordination: No dysmetria on finger-to-nose  bilaterally  Reflexes: Downgoing toes bilaterally           LABS:                        13.0   9.58  )-----------( 169      ( 21 Sep 2023 05:20 )             40.5     09-21    139  |  106  |  8   ----------------------------<  131<H>  3.1<L>   |  26  |  0.57    Ca    8.7      21 Sep 2023 05:20  Phos  2.3     09-21  Mg     1.9     09-21    TPro  7.1  /  Alb  2.9<L>  /  TBili  0.4  /  DBili  x   /  AST  16  /  ALT  12  /  AlkPhos  91  09-21      Urinalysis Basic - ( 21 Sep 2023 05:20 )    Color: x / Appearance: x / SG: x / pH: x  Gluc: 131 mg/dL / Ketone: x  / Bili: x / Urobili: x   Blood: x / Protein: x / Nitrite: x   Leuk Esterase: x / RBC: x / WBC x   Sq Epi: x / Non Sq Epi: x / Bacteria: x        RADIOLOGY & ADDITIONAL TESTS:  < from: MR Head w/wo IV Cont (09.20.23 @ 16:25) >  Ovoid areas of signal abnormality in the bilateral cerebellar   hemispheres. This may represent atypical PRES versus toxic metabolic   etiology. Infection is less likely given lack of restricted diffusion or   enhancement.    No acute infarction, mass effect or pathologic enhancement.      Video EEG:    Events:  1)	No electrographic seizures or significant clinical events occurred during this study.  Provocations:  •	Hyperventilation: was not performed.  •	Photic stimulation: was not performed.  Daily Summary:    1)	There was mild diffuse slowing of background activity  2)	There were no findings of active epilepsy, however this alone does not rule out the diagnosis.

## 2023-09-21 NOTE — PROGRESS NOTE ADULT - ASSESSMENT
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 Would favor a viral etiology given normal WBC on presentation, lack of neutrophils in CSF, high CSF glucose and negative CSF PCR. CSF PCR negative thus far. Pending further workup to determine exact etiology.    Recommend:  1.  Can stop Acyclovir given negative HSV 1/2 CSF PCR  2.  Continue Ceftriaxone 2g IV q12hrs. If Lyme antibody is negative and CSF cultures remains NGTD on 9/22/23, ok to stop Ceftriaxone on 9/22/23  3.  Add onto CSF: [place] [facility] encephalitis panel    ID team 1 will follow

## 2023-09-21 NOTE — EEG REPORT - NS EEG TEXT BOX
[facility] Department of Neurology  Inpatient Continuous video-Electroencephalogram      Patient Name:	SHIRLEY DUKES    :	1960  MRN:	[id number]    Study Start Date/Time:	2023, 9:03:06 PM  Study End Date/Time:    Referred by:  George Angel MD    Brief Clinical History:  SHIRLEY DUKES is a 62 year old Female with thunderclap headache and altered mental status; study performed to investigate for seizures or markers of epilepsy.   Technologist notes: -  Diagnosis Code:  R56.9 convulsions/seizure    Pertinent Medication:  n/a    Acquisition Details:  Electroencephalography was acquired using a minimum of 21 channels on an [employer] Neurology system v 9.3.1 with electrode placement according to the standard International 10-20 system following ACNS (American Clinical Neurophysiology Society) guidelines.  Anterior temporal T1 and T2 electrodes were utilized whenever possible.  The XLTEK automated spike & seizure detections were all reviewed in detail, in addition to the entire raw EEG.    Findings:  Day 1:  2023, 9:03:06 PM to next morning at 07:00 AM   Background:  continuous, with predominantly alpha and beta frequencies.  Generalized Slowing:  None  Symmetry/Focality: No persistent asymmetries of voltage or frequency.     Voltage:  Normal (20+ uV)  Organization:  Appropriate anterior-posterior gradient  Posterior Dominant Rhythm:  7-8 Hz symmetric, well-organized, and well-modulated  Sleep:  Symmetric, synchronous spindles and K complexes.  Variability:   Yes		Reactivity:  Yes    Spontaneous Activity:  No epileptiform discharges   Events:  1)	No electrographic seizures or significant clinical events occurred during this study.  Provocations:  •	Hyperventilation: was not performed.  •	Photic stimulation: was not performed.  Daily Summary:    1)	There was mild diffuse slowing of background activity  2)	There were no findings of active epilepsy, however this alone does not rule out the diagnosis.         Edison Pang MD  Attending Neurologist, [facility]

## 2023-09-21 NOTE — PROGRESS NOTE ADULT - PROBLEM SELECTOR PLAN 1
Patient presented with thunderclap headache, blurry vision, AMS and hypertension. Differentials include Hypertensive emergency vs Pres vs meningitis. UTox negative. LP performed on 09/18, CSF protein 79, glucose 75- otherwise unremarkable. MRI indicative of atypical PRES vs. toxic metabolic causes of AMS. 9/21, patient A&Ox3.   - Trauma to the head 3 days prior w/o changes on CT imaging.  - blood and CSF cultures with no growth to date  - d/c acyclovir and LR   - Studies negative: vitamin B1, B6, folate, B12, vitamin D 25-OH, vitamin E, TSH, RPR, HIV  - 9/20: MRI Ovoid areas of signal abnormality in the bilateral cerebellar hemispheres. This may represent atypical PRES versus toxic metabolic etiology. Infection is less likely given lack of restricted diffusion or enhancement.  plan  - Per ID: stop Acyclovir given negative HSV 1/2 CSF PCR,  Continue Ceftriaxone 2g IV q12hrs. If Lyme antibody is negative and CSF cultures remains NGTD on 9/22/23, ok to stop Ceftriaxone on 9/22/23, Add onto CSF: [place] encephalitis panel CTX 2g IV BID (for lyme coverage)  - Neuro:  Can discontinue EEG  - Provide strong environmental cues to maintain normal sleep/wake cycle; Daytime - frequent verbal engagement and reorientation, full light in room, encourage family visits, make sure patient has seeing eyeglasses/hearing aids; NightTime - reduce light noise, avoid non-essential procedures between midnight and 6AM.  - Please call Neurology # [phone] if there is any change in patient's status.  - F/u CSF studies (anti-NMDA receptor encephalitis, [place] encephalitis [place], WNV, coxsackie, echovirus, human parechovirus, HIV, EBV)

## 2023-09-21 NOTE — PROGRESS NOTE ADULT - ASSESSMENT
62F presents w/ AMS, elevated /106 w/ HA, and blurry vision likely 2/2 to HTN emergency improved on Nicardipine drip but continues to be altered. Patient was intubated and sedated 9/18 for LP.     Neuro  #AMS   Patient presented with thunderclap headache, blurry vision, AMS and hypertension. Differentials include Hypertensive emergency vs Pres vs meningitis. UTox negative. LP performed on 09/18, CSF protein 79, glucose 75- otherwise unremarkable.   - c/w fentanyl and propofol ggt for sedation   - blood and CSF cultures with no growth to date  - per ID, c/w CTX 2g IV BID (for lyme coverage) and acyclovir 500mg IV q8hr with LR @ 80cc/hr (until 2 negative HSV)  - d/c vanc and ampicillin  - F/u CSF studies (anti-NMDA receptor encephalitis, [place], WNV, coxsackie, echovirus, human parechovirus, HIV, EBV)   - f/u MRI to r/o PRES   - f/u vitamin B1, B6, folate, B12, vitamin D 25-OH, vitamin E, TSH, RPR, HIV    #HA  Trauma to the head 3 days prior w/o changes on CT imaging.  - SHRADDHA     CVS  #Hypertensive emergency  Treated in ED w/ 2 runs of Nicardipine drip 25mL/hr, 1 10mg Labetalol IVP, goal of 20% reduction. BP has since normalized to SBP 130s-180s   - d/c nicardipine drip  - No EKG changes    #LUE edema  New LUE edema 9/20 am  - Arterial doppler LUE   - f/u lactate    Respiratory  - Maintain intubation until MRI obtained     Renal  UA on admission with >300 protein.  - Repeat UA with lytes  - 24hr urine protein study  - f/u SPEP/UPEP    Endo  - f/u A1c and Lipid panel (cholesterol 210) to evaluate chronicity of HTN     ID  #SIRS (RESOLVED)  2/4 SIRS criteria (febrile, tachycardic) with possible meningitis source. CXR negative, UA negative  - f/u LP results   - c/w CTX 2g IV BID (for lyme coverage) and acyclovir 500mg IV q8hr with LR @ 80cc/hr (until 2 negative HSV)  - f/u blood/urine cultures      Heme  Hgb dropped 15.4 --> 12.8 --> 12.2. Potentially 2/2 dilution. No overt signs of bleeding on exam.   - Trend H/H     Prophylactic  Fluids: LR @ 80cc/hr   Electrolytes: replete as needed  Nutrition: NPO   GI: pantoprazole 40mg IV daily   DVT: Lovenox 30mg subQ daily   Dispo: MICU      62F presents w/ AMS, elevated /106 w/ HA, and blurry vision likely 2/2 to HTN emergency improved on Nicardipine drip but continues to be altered. Patient was intubated and sedated 9/18 for LP. LP results unremarkable to date. MRI 9/20 indicative of atypical PRES vs. toxic metabolic causes of AMS.     Neuro  #AMS   Patient presented with thunderclap headache, blurry vision, AMS and hypertension. Differentials include Hypertensive emergency vs Pres vs meningitis. UTox negative. LP performed on 09/18, CSF protein 79, glucose 75- otherwise unremarkable. MRI indicative of atypical PRES vs. toxic metabolic causes of AMS.   - wean precedex   - blood and CSF cultures with no growth to date  - d/c acyclovir and LR   - c/w CTX 2g IV BID (for lyme coverage)  - F/u CSF studies (anti-NMDA receptor encephalitis, [place], WNV, coxsackie, echovirus, human parechovirus, HIV, EBV)  - f/u vitamin B1, B6, folate, B12, vitamin D 25-OH, vitamin E, TSH, RPR, HIV    #HA  Trauma to the head 3 days prior w/o changes on CT imaging.  - SHRADDHA     #Alcohol Use  Patient reports almost daily drinking of Gisele. Reports history of passing out from alcohol use. Denies history of hospitalization for alcohol withdrawal.   - [place] protocol     CVS  #Hypertensive emergency  Treated in ED w/ 2 runs of Nicardipine drip 25mL/hr, 1 10mg Labetalol IVP, goal of 20% reduction. BP has since normalized to SBP 130s-180s   - wean nicardipine drip today  - start home triamterene 37.5mg/hydrochlorothiazide 25mg qday   - No EKG changes    #LUE edema  New LUE edema 9/20 am. Improved 9/21 with strong pulse   - SHRADDHA     Respiratory  Patient extubated 9/21. Satting well on 3L NC.   - SHRADDHA     Renal  UA on admission with >300 protein. UA 9/20 unremarkable   - SHRADDHA     Endo  - f/u A1c and Lipid panel (cholesterol 210) to evaluate chronicity of HTN     ID  #SIRS (RESOLVED)  2/4 SIRS criteria (febrile, tachycardic) with possible meningitis source. CXR negative, UA negative. Cultures NGTD  - c/w CTX 2g IV BID (for lyme coverage)   - f/u blood/urine cultures      Heme  Hgb dropped 15.4 --> 12.8 --> 12.2 . Increased to 13.0 on 9/21. Potentially 2/2 dilution. No overt signs of bleeding on exam.   - Trend H/H     Prophylactic  Fluids: LR @ 80cc/hr   Electrolytes: replete as needed  Nutrition: regular diet (passed bedside dysphagia)   GI: pantoprazole 40mg IV daily   DVT: Lovenox 30mg subQ daily   Dispo: MICU      62F presents w/ AMS, elevated /106 w/ HA, and blurry vision likely 2/2 to HTN emergency improved on Nicardipine drip but continues to be altered. Patient was intubated and sedated 9/18 for LP. LP results unremarkable to date. MRI 9/20 indicative of atypical PRES vs. toxic metabolic causes of AMS.     Neuro  #AMS   Patient presented with thunderclap headache, blurry vision, AMS and hypertension. Differentials include Hypertensive emergency vs Pres vs meningitis. UTox negative. LP performed on 09/18, CSF protein 79, glucose 75- otherwise unremarkable. MRI indicative of atypical PRES vs. toxic metabolic causes of AMS.   - wean precedex   - blood and CSF cultures with no growth to date  - d/c acyclovir and LR   - c/w CTX 2g IV BID (for lyme coverage)  - f/u EEG results   - f/u with neuro regarding MRI results   - F/u CSF studies (anti-NMDA receptor encephalitis, [place], WNV, coxsackie, echovirus, human parechovirus, HIV, EBV)  - f/u vitamin B1, B6, folate, B12, vitamin D 25-OH, vitamin E, TSH, RPR, HIV    #HA  Trauma to the head 3 days prior w/o changes on CT imaging.  - SHRADDHA     #Alcohol Use  Patient reports almost daily drinking of Gisele. Reports history of passing out from alcohol use. Denies history of hospitalization for alcohol withdrawal.   - WA protocol     CVS  #Hypertensive emergency  Treated in ED w/ 2 runs of Nicardipine drip 25mL/hr, 1 10mg Labetalol IVP, goal of 20% reduction. BP has since normalized to SBP 130s-180s   - wean nicardipine drip today  - start home triamterene 37.5mg/hydrochlorothiazide 25mg qday   - No EKG changes    #LUE edema  New LUE edema 9/20 am. Improved 9/21 with strong pulse   - SHRADDHA     Respiratory  Patient extubated 9/21. Satting well on 3L NC.   - SHRADDHA     Renal  UA on admission with >300 protein. UA 9/20 unremarkable   - SHRADDHA     Endo  - f/u A1c and Lipid panel (cholesterol 210) to evaluate chronicity of HTN     ID  #SIRS (RESOLVED)  2/4 SIRS criteria (febrile, tachycardic) with possible meningitis source. CXR negative, UA negative. Cultures NGTD  - c/w CTX 2g IV BID (for lyme coverage)   - f/u blood/urine cultures      Heme  Hgb dropped 15.4 --> 12.8 --> 12.2 . Increased to 13.0 on 9/21. Potentially 2/2 dilution. No overt signs of bleeding on exam.   - Trend H/H     Prophylactic  Fluids: LR @ 80cc/hr   Electrolytes: replete as needed  Nutrition: regular diet (passed bedside dysphagia)   GI: pantoprazole 40mg IV daily   DVT: Lovenox 30mg subQ daily   Dispo: MICU      62F presents w/ AMS, elevated /106 w/ HA, and blurry vision likely 2/2 to HTN emergency improved on Nicardipine drip but continues to be altered. Patient was intubated and sedated 9/18 for LP. LP results unremarkable to date. MRI 9/20 indicative of atypical PRES vs. toxic metabolic causes of AMS.     Neuro  #AMS (RESOLVED)  Patient presented with thunderclap headache, blurry vision, AMS and hypertension. Differentials include Hypertensive emergency vs Pres vs meningitis. UTox negative. LP performed on 09/18, CSF protein 79, glucose 75- otherwise unremarkable. MRI indicative of atypical PRES vs. toxic metabolic causes of AMS. 9/21, patient A&Ox3.   - wean precedex   - blood and CSF cultures with no growth to date  - d/c acyclovir and LR   - c/w CTX 2g IV BID (for lyme coverage)  - f/u EEG results   - f/u with neuro regarding MRI results   - F/u CSF studies (anti-NMDA receptor encephalitis, [place], WNV, coxsackie, echovirus, human parechovirus, HIV, EBV)  - f/u vitamin B1, B6, folate, B12, vitamin D 25-OH, vitamin E, TSH, RPR, HIV    #HA  Trauma to the head 3 days prior w/o changes on CT imaging. Patient reports headache less severe than it was upon initial presentation.   - SHRADDHA     #Alcohol Use  Patient reports almost daily drinking of Gisele. Reports history of passing out from alcohol use. Denies history of hospitalization for alcohol withdrawal.   - WA protocol     CVS  #Hypertensive emergency  Treated in ED w/ 2 runs of Nicardipine drip 25mL/hr, 1 10mg Labetalol IVP, goal of 20% reduction. BP has since normalized to SBP 130s-180s   - wean nicardipine drip today  - start home triamterene 37.5mg/hydrochlorothiazide 25mg qday   - No EKG changes    #LUE edema  New LUE edema 9/20 am. Improved 9/21 with strong pulse   - SHRADDHA     Respiratory  Patient extubated 9/21. Satting well on 3L NC.   - SHRADDHA     Renal  UA on admission with >300 protein. UA 9/20 unremarkable   - SHRADDHA     Endo  - f/u A1c and Lipid panel (cholesterol 210) to evaluate chronicity of HTN     ID  #SIRS (RESOLVED)  2/4 SIRS criteria (febrile, tachycardic) with possible meningitis source. CXR negative, UA negative. Cultures NGTD  - c/w CTX 2g IV BID (for lyme coverage)   - f/u blood/urine cultures      Heme  Hgb dropped 15.4 --> 12.8 --> 12.2 . Increased to 13.0 on 9/21. Potentially 2/2 dilution. No overt signs of bleeding on exam.   - Trend H/H     Prophylactic  Fluids: none  Electrolytes: replete as needed  Nutrition: regular diet (passed bedside dysphagia)   GI: pantoprazole 40mg IV daily   DVT: Lovenox 30mg subQ daily   Dispo: MICU

## 2023-09-21 NOTE — PROGRESS NOTE ADULT - PROBLEM SELECTOR PLAN 2
Treated in ED w/ 2 runs of Nicardipine drip 25mL/hr, 1 10mg Labetalol IVP, goal of 20% reduction. BP has since normalized to SBP 130s-180s, nicardipine drip stopped, no EKG changes  plan  - Continue home triamterene 37.5mg/hydrochlorothiazide 25mg qday  - Continue with Metoprolol succinate 100 mg q day

## 2023-09-21 NOTE — PROGRESS NOTE ADULT - PROBLEM SELECTOR PLAN 5
Fluids: none  Electrolytes: replete as needed  Nutrition: regular diet (passed bedside dysphagia)   GI: pantoprazole 40mg IV daily   DVT: Lovenox 30mg subQ daily   Dispo: tele

## 2023-09-21 NOTE — PROGRESS NOTE ADULT - SUBJECTIVE AND OBJECTIVE BOX
INTERVAL HPI/OVERNIGHT EVENTS:    Patient was seen and examined at bedside.  Extubated.  Was agitated, now with 1:1 at bedside    ROS:    unable to obtain         ANTIBIOTICS/RELEVANT:    MEDICATIONS  (STANDING):  cefTRIAXone   IVPB 2000 milliGRAM(s) IV Intermittent every 12 hours  chlorhexidine 2% Cloths 1 Application(s) Topical <User Schedule>  dexMEDEtomidine Infusion 0.1 MICROgram(s)/kG/Hr (1.25 mL/Hr) IV Continuous <Continuous>  enoxaparin Injectable 30 milliGRAM(s) SubCutaneous every 24 hours  niCARdipine Infusion 5 mG/Hr (25 mL/Hr) IV Continuous <Continuous>  pantoprazole  Injectable 40 milliGRAM(s) IV Push daily  thiamine IVPB 500 milliGRAM(s) IV Intermittent every 8 hours  triamterene 37.5 mG/hydrochlorothiazide 25 mG Tablet 1 Tablet(s) Oral daily    MEDICATIONS  (PRN):        Vital Signs Last 24 Hrs  T(C): 36.3 (21 Sep 2023 06:03), Max: 36.7 (21 Sep 2023 01:02)  T(F): 97.3 (21 Sep 2023 06:03), Max: 98.1 (21 Sep 2023 01:02)  HR: 65 (21 Sep 2023 09:00) (60 - 104)  BP: 143/71 (21 Sep 2023 09:00) (116/55 - 192/95)  BP(mean): 99 (21 Sep 2023 09:00) (76 - 146)  RR: 21 (21 Sep 2023 09:00) (13 - 40)  SpO2: 97% (21 Sep 2023 09:00) (94% - 100%)    Parameters below as of 21 Sep 2023 09:00  Patient On (Oxygen Delivery Method): nasal cannula  O2 Flow (L/min): 2      PHYSICAL EXAM:  Constitutional: non-toxic, no distress  Eyes:HOANG, EOMI  Ear/Nose/Throat: no oral lesion, no sinus tenderness on percussion	  Neck:  supple  Respiratory: CTA gala  Cardiovascular: S1S2 RRR, no murmurs  Gastrointestinal:soft, (+) BS, no HSM  Extremities:no e/e/c  Vascular: DP Pulse:	right normal; left normal      LABS:                        13.0   9.58  )-----------( 169      ( 21 Sep 2023 05:20 )             40.5     09-21    139  |  106  |  8   ----------------------------<  131<H>  3.1<L>   |  26  |  0.57    Ca    8.7      21 Sep 2023 05:20  Phos  2.3     09-21  Mg     1.9     09-21    TPro  7.1  /  Alb  2.9<L>  /  TBili  0.4  /  DBili  x   /  AST  16  /  ALT  12  /  AlkPhos  91  09-21      Urinalysis Basic - ( 21 Sep 2023 05:20 )    Color: x / Appearance: x / SG: x / pH: x  Gluc: 131 mg/dL / Ketone: x  / Bili: x / Urobili: x   Blood: x / Protein: x / Nitrite: x   Leuk Esterase: x / RBC: x / WBC x   Sq Epi: x / Non Sq Epi: x / Bacteria: x        MICROBIOLOGY:    Culture - Blood (09.19.23 @ 02:42)    Specimen Source: .Blood Blood   Culture Results:   No growth at 2 days.    Culture - CSF with Gram Stain (09.18.23 @ 22:40)    Gram Stain:   No organisms seen  Rare WBC's   Specimen Source: .CSF csf fluid   Culture Results:   No growth to date        RADIOLOGY & ADDITIONAL STUDIES:

## 2023-09-21 NOTE — PROGRESS NOTE ADULT - ASSESSMENT
62 year old female with unknown PMH is being evaluated for headache followed by confusion. Patient is currently off sedation , no lateralization on neurological exam. Her presentation is mostly consistent with hypertensive encephalopathy, less likely of infectious/inflammatory etiology. MRi changes seen for the pt, could be infratentorial variant of PRES . The fact that lisa      Recommendations:  -  62 year old female with unknown PMH is being evaluated for headache followed by confusion. Patient is currently off sedation , no lateralization on neurological exam. Her presentation is mostly consistent with hypertensive encephalopathy, less likely of infectious/inflammatory etiology. MRi changes seen for the pt, could be infratentorial variant of PRES . The fact that patient is awake alert and coherent now with normal blood pressure is reassuring      Recommendations:  - Can discontinue EEG  - Management of blood pressure as per primary team  - Provide strong environmental cues to maintain normal sleep/wake cycle; Daytime - frequent verbal engagement and reorientation, full light in room, encourage family visits, make sure patient has seeing eyeglasses/hearing aids; NightTime - reduce light noise, avoid non-essential procedures between midnight and 6AM.  - Please call Neurology # [phone] if there is any chna 62 year old female with unknown PMH is being evaluated for headache followed by confusion. Patient is currently off sedation , no lateralization on neurological exam. Her presentation is mostly consistent with hypertensive encephalopathy, less likely of infectious/inflammatory etiology. MRi changes seen for the pt, could be infratentorial variant of PRES . The fact that patient is awake alert and coherent now with normal blood pressure is reassuring      Recommendations:  - Can discontinue EEG  - Management of blood pressure as per primary team  - Provide strong environmental cues to maintain normal sleep/wake cycle; Daytime - frequent verbal engagement and reorientation, full light in room, encourage family visits, make sure patient has seeing eyeglasses/hearing aids; NightTime - reduce light noise, avoid non-essential procedures between midnight and 6AM.  - Please call Neurology # [phone] if there is any change in patient's status.

## 2023-09-21 NOTE — PROGRESS NOTE ADULT - SUBJECTIVE AND OBJECTIVE BOX
INTERVAL HPI/OVERNIGHT EVENTS:    SUBJECTIVE: Patient seen and examined at bedside.     ROS: All negative except as listed above.    VITAL SIGNS:  ICU Vital Signs Last 24 Hrs  T(C): 36.3 (21 Sep 2023 06:03), Max: 36.7 (21 Sep 2023 01:02)  T(F): 97.3 (21 Sep 2023 06:03), Max: 98.1 (21 Sep 2023 01:02)  HR: 65 (21 Sep 2023 06:00) (60 - 104)  BP: 132/67 (21 Sep 2023 06:00) (116/55 - 192/95)  BP(mean): 93 (21 Sep 2023 06:00) (76 - 146)  ABP: --  ABP(mean): --  RR: 16 (21 Sep 2023 06:00) (13 - 40)  SpO2: 96% (21 Sep 2023 06:00) (94% - 100%)    O2 Parameters below as of 21 Sep 2023 06:00  Patient On (Oxygen Delivery Method): nasal cannula  O2 Flow (L/min): 3        Mode: AC/ CMV (Assist Control/ Continuous Mandatory Ventilation), RR (machine): 14, TV (machine): 350, FiO2: 30, PEEP: 5, MAP: 11, PIP: 17  Plateau pressure:   P/F ratio:     09-19 @ 07:01  -  09-20 @ 07:00  --------------------------------------------------------  IN: 3758.6 mL / OUT: 2115 mL / NET: 1643.6 mL    09-20 @ 07:01  -  09-21 @ 06:58  --------------------------------------------------------  IN: 2087.5 mL / OUT: 1667 mL / NET: 420.5 mL      CAPILLARY BLOOD GLUCOSE          ECG: reviewed.    PHYSICAL EXAM:    GENERAL: NAD, lying in bed comfortably  HEAD:  Atraumatic, normocephalic  EYES: EOMI, PERRLA, conjunctiva and sclera clear  NECK: Supple, trachea midline, no JVD  HEART: Regular rate and rhythm, no murmurs, rubs, or gallops  LUNGS: Unlabored respirations.  Clear to auscultation bilaterally, no crackles, wheezing, or rhonchi  ABDOMEN: Soft, nontender, nondistended, +BS  EXTREMITIES: 2+ peripheral pulses bilaterally, cap refill<2 secs. No clubbing, cyanosis, or edema  NERVOUS SYSTEM:  A&Ox3, following commands, moving all extremities, no focal deficits   SKIN: No rashes or lesions    MEDICATIONS:  MEDICATIONS  (STANDING):  cefTRIAXone   IVPB 2000 milliGRAM(s) IV Intermittent every 12 hours  chlorhexidine 2% Cloths 1 Application(s) Topical <User Schedule>  dexMEDEtomidine Infusion 0.1 MICROgram(s)/kG/Hr (1.25 mL/Hr) IV Continuous <Continuous>  enoxaparin Injectable 30 milliGRAM(s) SubCutaneous every 24 hours  niCARdipine Infusion 5 mG/Hr (25 mL/Hr) IV Continuous <Continuous>  pantoprazole  Injectable 40 milliGRAM(s) IV Push daily  thiamine IVPB 500 milliGRAM(s) IV Intermittent every 8 hours    MEDICATIONS  (PRN):      ALLERGIES:  Allergies    No Known Allergies    Intolerances        LABS:                        13.0   9.58  )-----------( 169      ( 21 Sep 2023 05:20 )             40.5     09-21    139  |  106  |  8   ----------------------------<  131<H>  3.1<L>   |  26  |  0.57    Ca    8.7      21 Sep 2023 05:20  Phos  2.3     09-21  Mg     1.9     09-21    TPro  7.1  /  Alb  2.9<L>  /  TBili  0.4  /  DBili  x   /  AST  16  /  ALT  12  /  AlkPhos  91  09-21      Urinalysis Basic - ( 21 Sep 2023 05:20 )    Color: x / Appearance: x / SG: x / pH: x  Gluc: 131 mg/dL / Ketone: x  / Bili: x / Urobili: x   Blood: x / Protein: x / Nitrite: x   Leuk Esterase: x / RBC: x / WBC x   Sq Epi: x / Non Sq Epi: x / Bacteria: x      ABG:      vBG:    Micro:    Culture - Blood (collected 09-19-23 @ 02:42)  Source: .Blood Blood  Preliminary Report (09-21-23 @ 03:00):    No growth at 2 days.       Urinalysis with Rflx Culture (collected 09-18-23 @ 18:32)         RADIOLOGY & ADDITIONAL TESTS: Reviewed.   INTERVAL HPI/OVERNIGHT EVENTS: placed on eeg. restarted nicardipine drip with goal bp of 130-150 for PRES.     SUBJECTIVE: Patient seen and examined at bedside. Reports she feels "out of it" and hungry. Otherwise, denies headache, chest pain, SOB, abdominal pain.     ROS: All negative except as listed above.    VITAL SIGNS:  ICU Vital Signs Last 24 Hrs  T(C): 36.3 (21 Sep 2023 06:03), Max: 36.7 (21 Sep 2023 01:02)  T(F): 97.3 (21 Sep 2023 06:03), Max: 98.1 (21 Sep 2023 01:02)  HR: 65 (21 Sep 2023 06:00) (60 - 104)  BP: 132/67 (21 Sep 2023 06:00) (116/55 - 192/95)  BP(mean): 93 (21 Sep 2023 06:00) (76 - 146)  ABP: --  ABP(mean): --  RR: 16 (21 Sep 2023 06:00) (13 - 40)  SpO2: 96% (21 Sep 2023 06:00) (94% - 100%)    O2 Parameters below as of 21 Sep 2023 06:00  Patient On (Oxygen Delivery Method): nasal cannula  O2 Flow (L/min): 3        Mode: AC/ CMV (Assist Control/ Continuous Mandatory Ventilation), RR (machine): 14, TV (machine): 350, FiO2: 30, PEEP: 5, MAP: 11, PIP: 17  Plateau pressure:   P/F ratio:     09-19 @ 07:01  -  09-20 @ 07:00  --------------------------------------------------------  IN: 3758.6 mL / OUT: 2115 mL / NET: 1643.6 mL    09-20 @ 07:01  -  09-21 @ 06:58  --------------------------------------------------------  IN: 2087.5 mL / OUT: 1667 mL / NET: 420.5 mL      CAPILLARY BLOOD GLUCOSE          ECG: reviewed.    PHYSICAL EXAM:    GENERAL: NAD, lying in bed comfortably with restraints  HEAD:  Atraumatic, normocephalic, with EEG.   EYES: EOMI, PERRLA, conjunctiva and sclera clear  NECK: Supple, trachea midline, no JVD  HEART: Regular rate and rhythm, no murmurs, rubs, or gallops  LUNGS: Unlabored respirations.  Clear to auscultation bilaterally, no crackles, wheezing, or rhonchi  ABDOMEN: Soft, nontender, nondistended, +BS  EXTREMITIES: 2+ peripheral pulses bilaterally upper and lower extremities. Extremities warm. LUE edema improved from yesterday. Otherwise, no clubbing, cyanosis, or edema  NERVOUS SYSTEM:  A&Ox3, following commands, slow to respond, moving all extremities, no focal deficits   SKIN: No rashes or lesions    MEDICATIONS:  MEDICATIONS  (STANDING):  cefTRIAXone   IVPB 2000 milliGRAM(s) IV Intermittent every 12 hours  chlorhexidine 2% Cloths 1 Application(s) Topical <User Schedule>  dexMEDEtomidine Infusion 0.1 MICROgram(s)/kG/Hr (1.25 mL/Hr) IV Continuous <Continuous>  enoxaparin Injectable 30 milliGRAM(s) SubCutaneous every 24 hours  niCARdipine Infusion 5 mG/Hr (25 mL/Hr) IV Continuous <Continuous>  pantoprazole  Injectable 40 milliGRAM(s) IV Push daily  thiamine IVPB 500 milliGRAM(s) IV Intermittent every 8 hours    MEDICATIONS  (PRN):      ALLERGIES:  Allergies    No Known Allergies    Intolerances        LABS:                        13.0   9.58  )-----------( 169      ( 21 Sep 2023 05:20 )             40.5     09-21    139  |  106  |  8   ----------------------------<  131<H>  3.1<L>   |  26  |  0.57    Ca    8.7      21 Sep 2023 05:20  Phos  2.3     09-21  Mg     1.9     09-21    TPro  7.1  /  Alb  2.9<L>  /  TBili  0.4  /  DBili  x   /  AST  16  /  ALT  12  /  AlkPhos  91  09-21      Urinalysis Basic - ( 21 Sep 2023 05:20 )    Color: x / Appearance: x / SG: x / pH: x  Gluc: 131 mg/dL / Ketone: x  / Bili: x / Urobili: x   Blood: x / Protein: x / Nitrite: x   Leuk Esterase: x / RBC: x / WBC x   Sq Epi: x / Non Sq Epi: x / Bacteria: x      ABG:      vBG:    Micro:    Culture - Blood (collected 09-19-23 @ 02:42)  Source: .Blood Blood  Preliminary Report (09-21-23 @ 03:00):    No growth at 2 days.       Urinalysis with Rflx Culture (collected 09-18-23 @ 18:32)         RADIOLOGY & ADDITIONAL TESTS: Reviewed.   INTERVAL HPI/OVERNIGHT EVENTS: placed on eeg. restarted nicardipine drip with goal bp of 130-150 for PRES.    SUBJECTIVE: Patient seen and examined at bedside. Reports she feels "out of it" and hungry. Also reports headache is improved from initial presentation. Otherwise, denies chest pain, SOB, abdominal pain, pain in extremities. Reports she smokes a pack of cigarettes every 3 days and drinks Gisele most days. Denies having been hospitalized for alcohol withdrawal. Also reports she has been hospitalized a few years ago for a blood transfusion.       VITAL SIGNS:  ICU Vital Signs Last 24 Hrs  T(C): 36.3 (21 Sep 2023 06:03), Max: 36.7 (21 Sep 2023 01:02)  T(F): 97.3 (21 Sep 2023 06:03), Max: 98.1 (21 Sep 2023 01:02)  HR: 65 (21 Sep 2023 06:00) (60 - 104)  BP: 132/67 (21 Sep 2023 06:00) (116/55 - 192/95)  BP(mean): 93 (21 Sep 2023 06:00) (76 - 146)  ABP: --  ABP(mean): --  RR: 16 (21 Sep 2023 06:00) (13 - 40)  SpO2: 96% (21 Sep 2023 06:00) (94% - 100%)    O2 Parameters below as of 21 Sep 2023 06:00  Patient On (Oxygen Delivery Method): nasal cannula  O2 Flow (L/min): 3        Mode: AC/ CMV (Assist Control/ Continuous Mandatory Ventilation), RR (machine): 14, TV (machine): 350, FiO2: 30, PEEP: 5, MAP: 11, PIP: 17  Plateau pressure:   P/F ratio:     09-19 @ 07:01  -  09-20 @ 07:00  --------------------------------------------------------  IN: 3758.6 mL / OUT: 2115 mL / NET: 1643.6 mL    09-20 @ 07:01  -  09-21 @ 06:58  --------------------------------------------------------  IN: 2087.5 mL / OUT: 1667 mL / NET: 420.5 mL      CAPILLARY BLOOD GLUCOSE          ECG: reviewed.    PHYSICAL EXAM:    GENERAL: NAD, lying in bed comfortably with restraints  HEAD:  Atraumatic, normocephalic, with EEG.   EYES: EOMI, PERRLA, conjunctiva and sclera clear  NECK: Supple, trachea midline, no JVD  HEART: Regular rate and rhythm, no murmurs, rubs, or gallops  LUNGS: Unlabored respirations.  Clear to auscultation bilaterally, no crackles, wheezing, or rhonchi  ABDOMEN: Soft, nontender, nondistended, +BS  EXTREMITIES: 2+ peripheral pulses bilaterally upper and lower extremities. Extremities warm. LUE edema improved from yesterday. Otherwise, no clubbing, cyanosis, or edema  NERVOUS SYSTEM:  A&Ox3, following commands, slow to respond, moving all extremities, no focal deficits   SKIN: No rashes or lesions    MEDICATIONS:  MEDICATIONS  (STANDING):  cefTRIAXone   IVPB 2000 milliGRAM(s) IV Intermittent every 12 hours  chlorhexidine 2% Cloths 1 Application(s) Topical <User Schedule>  dexMEDEtomidine Infusion 0.1 MICROgram(s)/kG/Hr (1.25 mL/Hr) IV Continuous <Continuous>  enoxaparin Injectable 30 milliGRAM(s) SubCutaneous every 24 hours  niCARdipine Infusion 5 mG/Hr (25 mL/Hr) IV Continuous <Continuous>  pantoprazole  Injectable 40 milliGRAM(s) IV Push daily  thiamine IVPB 500 milliGRAM(s) IV Intermittent every 8 hours    MEDICATIONS  (PRN):      ALLERGIES:  Allergies    No Known Allergies    Intolerances        LABS:                        13.0   9.58  )-----------( 169      ( 21 Sep 2023 05:20 )             40.5     09-21    139  |  106  |  8   ----------------------------<  131<H>  3.1<L>   |  26  |  0.57    Ca    8.7      21 Sep 2023 05:20  Phos  2.3     09-21  Mg     1.9     09-21    TPro  7.1  /  Alb  2.9<L>  /  TBili  0.4  /  DBili  x   /  AST  16  /  ALT  12  /  AlkPhos  91  09-21      Urinalysis Basic - ( 21 Sep 2023 05:20 )    Color: x / Appearance: x / SG: x / pH: x  Gluc: 131 mg/dL / Ketone: x  / Bili: x / Urobili: x   Blood: x / Protein: x / Nitrite: x   Leuk Esterase: x / RBC: x / WBC x   Sq Epi: x / Non Sq Epi: x / Bacteria: x      ABG:      vBG:    Micro:    Culture - Blood (collected 09-19-23 @ 02:42)  Source: .Blood Blood  Preliminary Report (09-21-23 @ 03:00):    No growth at 2 days.       Urinalysis with Rflx Culture (collected 09-18-23 @ 18:32)         RADIOLOGY & ADDITIONAL TESTS: Reviewed. Hospital Course  62 year old female with history of hypertension and alcohol use who presented with headache and AMS. In the ED, BP was 237/106, HR 84, RR % on RA. Temperature was not taken in the ED. Exam was significant for agitation and mental status A&O2-3. Labs     Patient was given Labetalol 10mg IVP, Metoclopramide 5mg IVP and 2 runs of Nicardipine 40mg @ 25mL/hr. Patient was admitted to MICU for AMS workup and HTN management. On 9/18, patient was intubated on vent due to inability to tolerate LP. She was started on high dose thaimine and decadron 7.5mg IV. Overnight, LP was done and empiric coverage for bacterial meningitis was initiated (Vanc 750mg q12, CTX 2g q12, Acyclovir 500 q8hr, Ampicillin 2g q4 + LR @ 80cc/hr). K was  repleted 10meq IV x3. On 9/19, versed was d/c'ed  9/19: d/c versed. per ID, d/c vanc, ampicilln and decadron. repeat CBC stable. MRI head ordered. vanc trough ordered for 9/20. c/w fluids as patient on acyclovir. restrted Vanc (only had received one dose, so trough was low)  9/20: am repleted K and mag . awake/agitated, ordered 2 versed. will go for MRI brain, but there may be a problem with the MRI specific ventilator that may prevent patient going down. spep/upep ordered. HSV negative, d/c acyclovir and fluids. wean prop and fentanyl, extubated to NC.MRI read back ?pres  o/n: being placed on eeg. restarted nicardipine drip with goal bp of 130-150 for press.   9/21: improved arm swelling, d/c arterial US. bedside doppler w/ good pusle in radial. d/c restraints. repleted K _____ *** started CIWA monitoring (pt reports daily drinking)      INTERVAL HPI/OVERNIGHT EVENTS: placed on eeg. restarted nicardipine drip with goal bp of 130-150 for PRES.    SUBJECTIVE: Patient seen and examined at bedside. Reports she feels "out of it" and hungry. Also reports headache is improved from initial presentation. Otherwise, denies chest pain, SOB, abdominal pain, pain in extremities. Reports she smokes a pack of cigarettes every 3 days and drinks Gisele most days. Denies having been hospitalized for alcohol withdrawal. Also reports she has been hospitalized a few years ago for a blood transfusion.       VITAL SIGNS:  ICU Vital Signs Last 24 Hrs  T(C): 36.3 (21 Sep 2023 06:03), Max: 36.7 (21 Sep 2023 01:02)  T(F): 97.3 (21 Sep 2023 06:03), Max: 98.1 (21 Sep 2023 01:02)  HR: 65 (21 Sep 2023 06:00) (60 - 104)  BP: 132/67 (21 Sep 2023 06:00) (116/55 - 192/95)  BP(mean): 93 (21 Sep 2023 06:00) (76 - 146)  ABP: --  ABP(mean): --  RR: 16 (21 Sep 2023 06:00) (13 - 40)  SpO2: 96% (21 Sep 2023 06:00) (94% - 100%)    O2 Parameters below as of 21 Sep 2023 06:00  Patient On (Oxygen Delivery Method): nasal cannula  O2 Flow (L/min): 3        Mode: AC/ CMV (Assist Control/ Continuous Mandatory Ventilation), RR (machine): 14, TV (machine): 350, FiO2: 30, PEEP: 5, MAP: 11, PIP: 17  Plateau pressure:   P/F ratio:     09-19 @ 07:01  -  09-20 @ 07:00  --------------------------------------------------------  IN: 3758.6 mL / OUT: 2115 mL / NET: 1643.6 mL    09-20 @ 07:01  -  09-21 @ 06:58  --------------------------------------------------------  IN: 2087.5 mL / OUT: 1667 mL / NET: 420.5 mL      CAPILLARY BLOOD GLUCOSE          ECG: reviewed.    PHYSICAL EXAM:    GENERAL: NAD, lying in bed comfortably with restraints  HEAD:  Atraumatic, normocephalic, with EEG.   EYES: EOMI, PERRLA, conjunctiva and sclera clear  NECK: Supple, trachea midline, no JVD  HEART: Regular rate and rhythm, no murmurs, rubs, or gallops  LUNGS: Unlabored respirations.  Clear to auscultation bilaterally, no crackles, wheezing, or rhonchi  ABDOMEN: Soft, nontender, nondistended, +BS  EXTREMITIES: 2+ peripheral pulses bilaterally upper and lower extremities. Extremities warm. LUE edema improved from yesterday. Otherwise, no clubbing, cyanosis, or edema  NERVOUS SYSTEM:  A&Ox3, following commands, slow to respond, moving all extremities, no focal deficits   SKIN: No rashes or lesions    MEDICATIONS:  MEDICATIONS  (STANDING):  cefTRIAXone   IVPB 2000 milliGRAM(s) IV Intermittent every 12 hours  chlorhexidine 2% Cloths 1 Application(s) Topical <User Schedule>  dexMEDEtomidine Infusion 0.1 MICROgram(s)/kG/Hr (1.25 mL/Hr) IV Continuous <Continuous>  enoxaparin Injectable 30 milliGRAM(s) SubCutaneous every 24 hours  niCARdipine Infusion 5 mG/Hr (25 mL/Hr) IV Continuous <Continuous>  pantoprazole  Injectable 40 milliGRAM(s) IV Push daily  thiamine IVPB 500 milliGRAM(s) IV Intermittent every 8 hours    MEDICATIONS  (PRN):      ALLERGIES:  Allergies    No Known Allergies    Intolerances        LABS:                        13.0   9.58  )-----------( 169      ( 21 Sep 2023 05:20 )             40.5     09-21    139  |  106  |  8   ----------------------------<  131<H>  3.1<L>   |  26  |  0.57    Ca    8.7      21 Sep 2023 05:20  Phos  2.3     09-21  Mg     1.9     09-21    TPro  7.1  /  Alb  2.9<L>  /  TBili  0.4  /  DBili  x   /  AST  16  /  ALT  12  /  AlkPhos  91  09-21      Urinalysis Basic - ( 21 Sep 2023 05:20 )    Color: x / Appearance: x / SG: x / pH: x  Gluc: 131 mg/dL / Ketone: x  / Bili: x / Urobili: x   Blood: x / Protein: x / Nitrite: x   Leuk Esterase: x / RBC: x / WBC x   Sq Epi: x / Non Sq Epi: x / Bacteria: x      ABG:      vBG:    Micro:    Culture - Blood (collected 09-19-23 @ 02:42)  Source: .Blood Blood  Preliminary Report (09-21-23 @ 03:00):    No growth at 2 days.       Urinalysis with Rflx Culture (collected 09-18-23 @ 18:32)         RADIOLOGY & ADDITIONAL TESTS: Reviewed. Hospital Course  62 year old female with history of hypertension and alcohol use who presented with headache and AMS, found to have  severe HTN and fever to 100.8. In the ED, BP was 237/106, HR 84, RR % on RA. Initial temperature taken was 100.8. Exam was significant for agitation and mental status of A&O2-3. Patient was given Labetalol 10mg IVP, Metoclopramide 5mg IVP and 2 runs of Nicardipine 40mg @ 25mL/hr. Patient was admitted to MICU for AMS workup and HTN management. On 9/18, patient was intubated on vent due to inability to tolerate LP. She was started on high dose thiamine and decadron 7.5mg IV. Overnight, LP was done and empiric coverage for bacterial meningitis was initiated (Vanc 750mg q12, CTX 2g q12, Acyclovir 500 q8hr, Ampicillin 2g q4 + LR @ 80cc/hr). On 9/19, versed was d/c'ed. On 9/20, patient was restarted on versed due to agitation. Patient underwent MRI, read ovoid areas in b/l cerebellar hemispheres consistent with atypical PRES versus toxic metabolic etiology of AMS, infection unlikely. LP results were unremarkable, inconsistent with bacterial meningitis and HSV was negative. Vanc. ampicillin, acyclovir/lactated ringer and decadron were d/c'ed per ID. Ctx continued until lyme confirmed negative. Patient was extubated to NC, prop and fentanyl were weened. Still requiring precedex for agitation, but it is being weaned. Overnight on 9/20, patient was placed on EEG. Nicardipine drip was restarted [facility] goal BP of 130-150 for PRES. On 9/21, weaning nicardipine ggt and transitioning to PO antihypertensive regimen triamterene 36.5mg/hydrochlorothiazide 25mg. Patient started on CIWA protocol as patient now reports almost daily drinking.       INTERVAL HPI/OVERNIGHT EVENTS: placed on eeg. restarted nicardipine drip with goal bp of 130-150 for PRES.    SUBJECTIVE: Patient seen and examined at bedside. Reports she feels "out of it" and hungry. Also reports headache is improved from initial presentation. Otherwise, denies chest pain, SOB, abdominal pain, pain in extremities. Reports she smokes a pack of cigarettes every 3 days and drinks Gisele most days. Denies having been hospitalized for alcohol withdrawal. Also reports she has been hospitalized a few years ago for a blood transfusion.       VITAL SIGNS:  ICU Vital Signs Last 24 Hrs  T(C): 36.3 (21 Sep 2023 06:03), Max: 36.7 (21 Sep 2023 01:02)  T(F): 97.3 (21 Sep 2023 06:03), Max: 98.1 (21 Sep 2023 01:02)  HR: 65 (21 Sep 2023 06:00) (60 - 104)  BP: 132/67 (21 Sep 2023 06:00) (116/55 - 192/95)  BP(mean): 93 (21 Sep 2023 06:00) (76 - 146)  ABP: --  ABP(mean): --  RR: 16 (21 Sep 2023 06:00) (13 - 40)  SpO2: 96% (21 Sep 2023 06:00) (94% - 100%)    O2 Parameters below as of 21 Sep 2023 06:00  Patient On (Oxygen Delivery Method): nasal cannula  O2 Flow (L/min): 3        Mode: AC/ CMV (Assist Control/ Continuous Mandatory Ventilation), RR (machine): 14, TV (machine): 350, FiO2: 30, PEEP: 5, MAP: 11, PIP: 17  Plateau pressure:   P/F ratio:     09-19 @ 07:01  -  09-20 @ 07:00  --------------------------------------------------------  IN: 3758.6 mL / OUT: 2115 mL / NET: 1643.6 mL    09-20 @ 07:01  -  09-21 @ 06:58  --------------------------------------------------------  IN: 2087.5 mL / OUT: 1667 mL / NET: 420.5 mL      CAPILLARY BLOOD GLUCOSE          ECG: reviewed.    PHYSICAL EXAM:    GENERAL: NAD, lying in bed comfortably with restraints  HEAD:  Atraumatic, normocephalic, with EEG.   EYES: EOMI, PERRLA, conjunctiva and sclera clear  NECK: Supple, trachea midline, no JVD  HEART: Regular rate and rhythm, no murmurs, rubs, or gallops  LUNGS: Unlabored respirations.  Clear to auscultation bilaterally, no crackles, wheezing, or rhonchi  ABDOMEN: Soft, nontender, nondistended, +BS  EXTREMITIES: 2+ peripheral pulses bilaterally upper and lower extremities. Extremities warm. LUE edema improved from yesterday. Otherwise, no clubbing, cyanosis, or edema  NERVOUS SYSTEM:  A&Ox3, following commands, slow to respond, moving all extremities, no focal deficits   SKIN: No rashes or lesions    MEDICATIONS:  MEDICATIONS  (STANDING):  cefTRIAXone   IVPB 2000 milliGRAM(s) IV Intermittent every 12 hours  chlorhexidine 2% Cloths 1 Application(s) Topical <User Schedule>  dexMEDEtomidine Infusion 0.1 MICROgram(s)/kG/Hr (1.25 mL/Hr) IV Continuous <Continuous>  enoxaparin Injectable 30 milliGRAM(s) SubCutaneous every 24 hours  niCARdipine Infusion 5 mG/Hr (25 mL/Hr) IV Continuous <Continuous>  pantoprazole  Injectable 40 milliGRAM(s) IV Push daily  thiamine IVPB 500 milliGRAM(s) IV Intermittent every 8 hours    MEDICATIONS  (PRN):      ALLERGIES:  Allergies    No Known Allergies    Intolerances        LABS:                        13.0   9.58  )-----------( 169      ( 21 Sep 2023 05:20 )             40.5     09-21    139  |  106  |  8   ----------------------------<  131<H>  3.1<L>   |  26  |  0.57    Ca    8.7      21 Sep 2023 05:20  Phos  2.3     09-21  Mg     1.9     09-21    TPro  7.1  /  Alb  2.9<L>  /  TBili  0.4  /  DBili  x   /  AST  16  /  ALT  12  /  AlkPhos  91  09-21      Urinalysis Basic - ( 21 Sep 2023 05:20 )    Color: x / Appearance: x / SG: x / pH: x  Gluc: 131 mg/dL / Ketone: x  / Bili: x / Urobili: x   Blood: x / Protein: x / Nitrite: x   Leuk Esterase: x / RBC: x / WBC x   Sq Epi: x / Non Sq Epi: x / Bacteria: x      ABG:      vBG:    Micro:    Culture - Blood (collected 09-19-23 @ 02:42)  Source: .Blood Blood  Preliminary Report (09-21-23 @ 03:00):    No growth at 2 days.       Urinalysis with Rflx Culture (collected 09-18-23 @ 18:32)         RADIOLOGY & ADDITIONAL TESTS: Reviewed.

## 2023-09-21 NOTE — PROGRESS NOTE ADULT - ATTENDING COMMENTS
improvement of mental status with blood pressure control with MRI compatible with atypical variant of PRES. Patient conversant and follows complex commands with nonfocal exam today. Some hypophonia likely due to intubation.

## 2023-09-21 NOTE — PROGRESS NOTE ADULT - SUBJECTIVE AND OBJECTIVE BOX
Transfer Note:    62 year old female with history of hypertension and alcohol use who presented with headache and AMS, found to have  severe HTN and fever to 100.8. In the ED, BP was 237/106, HR 84, RR % on RA. Initial temperature taken was 100.8. Exam was significant for agitation and mental status of A&O2-3. Patient was given Labetalol 10mg IVP, Metoclopramide 5mg IVP and 2 runs of Nicardipine 40mg @ 25mL/hr. Patient was admitted to MICU for AMS workup and HTN management. On 9/18, patient was intubated on vent due to inability to tolerate LP. She was started on high dose thiamine and decadron 7.5mg IV. Overnight, LP was done and empiric coverage for bacterial meningitis was initiated (Vanc 750mg q12, CTX 2g q12, Acyclovir 500 q8hr, Ampicillin 2g q4 + LR @ 80cc/hr). On 9/19, versed was d/c'ed. On 9/20, patient was restarted on versed due to agitation. Patient underwent MRI, read ovoid areas in b/l cerebellar hemispheres consistent with atypical PRES versus toxic metabolic etiology of AMS, infection unlikely. LP results were unremarkable, inconsistent with bacterial meningitis and HSV was negative. Vanc. ampicillin, acyclovir/lactated ringer and decadron were d/c'ed per ID. Ctx continued until lyme confirmed negative. Patient was extubated to NC, prop and fentanyl were weened. Still requiring precedex for agitation, but it is being weaned. Overnight on 9/20, patient was placed on EEG. Nicardipine drip was restarted [facility] goal BP of 130-150 for PRES. On 9/21, weaning nicardipine ggt and transitioning to PO antihypertensive regimen triamterene 36.5mg/hydrochlorothiazide 25mg. Patient started on CIWA protocol as patient now reports almost daily drinking.       VITAL SIGNS:  Vital Signs Last 24 Hrs  T(C): 36.1 (21 Sep 2023 09:35), Max: 36.7 (21 Sep 2023 01:02)  T(F): 97 (21 Sep 2023 09:35), Max: 98.1 (21 Sep 2023 01:02)  HR: 82 (21 Sep 2023 18:02) (58 - 92)  BP: 132/87 (21 Sep 2023 18:02) (116/55 - 179/105)  BP(mean): 104 (21 Sep 2023 18:02) (76 - 136)  RR: 19 (21 Sep 2023 18:02) (13 - 40)  SpO2: 97% (21 Sep 2023 18:02) (92% - 100%)    Parameters below as of 21 Sep 2023 18:02  Patient On (Oxygen Delivery Method): room air        PHYSICAL EXAM:  General: NAD; speaking in full sentences, EEG placed on head  HEENT: NC/AT; PERRL; EOMI; MMM  Neck: supple; no JVD  Cardiac: RRR; +S1/S2  Pulm: CTA B/L; no W/R/R  GI: soft, NT/ND, +BS  Extremities: WWP; no edema, clubbing or cyanosis, 5/5 strength, sensory intact  Vasc: 2+ radial, DP pulses B/L  Neuro: A&Ox3, following commands, patient is able to verbalize without complications     MEDICATIONS:  MEDICATIONS  (STANDING):  cefTRIAXone   IVPB 2000 milliGRAM(s) IV Intermittent every 12 hours  chlorhexidine 2% Cloths 1 Application(s) Topical <User Schedule>  dexMEDEtomidine Infusion 0.1 MICROgram(s)/kG/Hr (1.25 mL/Hr) IV Continuous <Continuous>  enoxaparin Injectable 30 milliGRAM(s) SubCutaneous every 24 hours  metoprolol succinate  milliGRAM(s) Oral every 24 hours  pantoprazole  Injectable 40 milliGRAM(s) IV Push daily  triamterene 37.5 mG/hydrochlorothiazide 25 mG Tablet 1 Tablet(s) Oral daily    MEDICATIONS  (PRN):      ALLERGIES:  Allergies    No Known Allergies    Intolerances        LABS:                        13.0   9.58  )-----------( 169      ( 21 Sep 2023 05:20 )             40.5     09-21    139  |  106  |  8   ----------------------------<  131<H>  3.1<L>   |  26  |  0.57    Ca    8.7      21 Sep 2023 05:20  Phos  2.3     09-21  Mg     1.9     09-21    TPro  7.1  /  Alb  2.9<L>  /  TBili  0.4  /  DBili  x   /  AST  16  /  ALT  12  /  AlkPhos  91  09-21        RADIOLOGY & ADDITIONAL TESTS: Reviewed.

## 2023-09-22 LAB
ALBUMIN SERPL ELPH-MCNC: 3.1 G/DL — LOW (ref 3.3–5)
ALP SERPL-CCNC: 98 U/L — SIGNIFICANT CHANGE UP (ref 40–120)
ALT FLD-CCNC: 13 U/L — SIGNIFICANT CHANGE UP (ref 10–45)
ANION GAP SERPL CALC-SCNC: 9 MMOL/L — SIGNIFICANT CHANGE UP (ref 5–17)
AST SERPL-CCNC: 17 U/L — SIGNIFICANT CHANGE UP (ref 10–40)
BASOPHILS # BLD AUTO: 0.04 K/UL — SIGNIFICANT CHANGE UP (ref 0–0.2)
BASOPHILS NFR BLD AUTO: 0.5 % — SIGNIFICANT CHANGE UP (ref 0–2)
BILIRUB SERPL-MCNC: 0.4 MG/DL — SIGNIFICANT CHANGE UP (ref 0.2–1.2)
BUN SERPL-MCNC: 6 MG/DL — LOW (ref 7–23)
CALCIUM SERPL-MCNC: 9.2 MG/DL — SIGNIFICANT CHANGE UP (ref 8.4–10.5)
CHLORIDE SERPL-SCNC: 103 MMOL/L — SIGNIFICANT CHANGE UP (ref 96–108)
CO2 SERPL-SCNC: 25 MMOL/L — SIGNIFICANT CHANGE UP (ref 22–31)
CREAT SERPL-MCNC: 0.67 MG/DL — SIGNIFICANT CHANGE UP (ref 0.5–1.3)
CREATININE, URINE RESULT: 23 MG/DL — SIGNIFICANT CHANGE UP
EGFR: 98 ML/MIN/1.73M2 — SIGNIFICANT CHANGE UP
EOSINOPHIL # BLD AUTO: 0.06 K/UL — SIGNIFICANT CHANGE UP (ref 0–0.5)
EOSINOPHIL NFR BLD AUTO: 0.7 % — SIGNIFICANT CHANGE UP (ref 0–6)
GLUCOSE BLDC GLUCOMTR-MCNC: 132 MG/DL — HIGH (ref 70–99)
GLUCOSE SERPL-MCNC: 91 MG/DL — SIGNIFICANT CHANGE UP (ref 70–99)
HCT VFR BLD CALC: 42 % — SIGNIFICANT CHANGE UP (ref 34.5–45)
HGB BLD-MCNC: 13.5 G/DL — SIGNIFICANT CHANGE UP (ref 11.5–15.5)
IMM GRANULOCYTES NFR BLD AUTO: 0.3 % — SIGNIFICANT CHANGE UP (ref 0–0.9)
LYMPHOCYTES # BLD AUTO: 1.93 K/UL — SIGNIFICANT CHANGE UP (ref 1–3.3)
LYMPHOCYTES # BLD AUTO: 21.7 % — SIGNIFICANT CHANGE UP (ref 13–44)
MAGNESIUM SERPL-MCNC: 1.8 MG/DL — SIGNIFICANT CHANGE UP (ref 1.6–2.6)
MCHC RBC-ENTMCNC: 25.8 PG — LOW (ref 27–34)
MCHC RBC-ENTMCNC: 32.1 GM/DL — SIGNIFICANT CHANGE UP (ref 32–36)
MCV RBC AUTO: 80.2 FL — SIGNIFICANT CHANGE UP (ref 80–100)
MONOCYTES # BLD AUTO: 0.7 K/UL — SIGNIFICANT CHANGE UP (ref 0–0.9)
MONOCYTES NFR BLD AUTO: 7.9 % — SIGNIFICANT CHANGE UP (ref 2–14)
NEUTROPHILS # BLD AUTO: 6.12 K/UL — SIGNIFICANT CHANGE UP (ref 1.8–7.4)
NEUTROPHILS NFR BLD AUTO: 68.9 % — SIGNIFICANT CHANGE UP (ref 43–77)
NRBC # BLD: 0 /100 WBCS — SIGNIFICANT CHANGE UP (ref 0–0)
PHOSPHATE SERPL-MCNC: 2.4 MG/DL — LOW (ref 2.5–4.5)
PLATELET # BLD AUTO: 189 K/UL — SIGNIFICANT CHANGE UP (ref 150–400)
POTASSIUM SERPL-MCNC: 3.6 MMOL/L — SIGNIFICANT CHANGE UP (ref 3.5–5.3)
POTASSIUM SERPL-SCNC: 3.6 MMOL/L — SIGNIFICANT CHANGE UP (ref 3.5–5.3)
PROT ?TM UR-MCNC: 9 MG/DL — SIGNIFICANT CHANGE UP (ref 0–12)
PROT ?TM UR-MCNC: 9 MG/DL — SIGNIFICANT CHANGE UP (ref 0–12)
PROT SERPL-MCNC: 7.3 G/DL — SIGNIFICANT CHANGE UP (ref 6–8.3)
RBC # BLD: 5.24 M/UL — HIGH (ref 3.8–5.2)
RBC # FLD: 18.1 % — HIGH (ref 10.3–14.5)
SODIUM SERPL-SCNC: 137 MMOL/L — SIGNIFICANT CHANGE UP (ref 135–145)
VDRL CSF-TITR: SIGNIFICANT CHANGE UP
WBC # BLD: 8.88 K/UL — SIGNIFICANT CHANGE UP (ref 3.8–10.5)
WBC # FLD AUTO: 8.88 K/UL — SIGNIFICANT CHANGE UP (ref 3.8–10.5)
WNV IGG CSF IA-ACNC: POSITIVE
WNV IGM CSF IA-ACNC: NEGATIVE — SIGNIFICANT CHANGE UP

## 2023-09-22 PROCEDURE — 99232 SBSQ HOSP IP/OBS MODERATE 35: CPT

## 2023-09-22 PROCEDURE — 99233 SBSQ HOSP IP/OBS HIGH 50: CPT | Mod: GC

## 2023-09-22 RX ORDER — AMLODIPINE BESYLATE 2.5 MG/1
5 TABLET ORAL EVERY 24 HOURS
Refills: 0 | Status: DISCONTINUED | OUTPATIENT
Start: 2023-09-22 | End: 2023-09-23

## 2023-09-22 RX ORDER — MAGNESIUM SULFATE 500 MG/ML
2 VIAL (ML) INJECTION ONCE
Refills: 0 | Status: COMPLETED | OUTPATIENT
Start: 2023-09-22 | End: 2023-09-22

## 2023-09-22 RX ORDER — POTASSIUM CHLORIDE 20 MEQ
40 PACKET (EA) ORAL ONCE
Refills: 0 | Status: COMPLETED | OUTPATIENT
Start: 2023-09-22 | End: 2023-09-22

## 2023-09-22 RX ORDER — HYDRALAZINE HCL 50 MG
5 TABLET ORAL ONCE
Refills: 0 | Status: COMPLETED | OUTPATIENT
Start: 2023-09-22 | End: 2023-09-22

## 2023-09-22 RX ORDER — HYDRALAZINE HCL 50 MG
5 TABLET ORAL ONCE
Refills: 0 | Status: DISCONTINUED | OUTPATIENT
Start: 2023-09-22 | End: 2023-09-23

## 2023-09-22 RX ORDER — ACETAMINOPHEN 500 MG
1000 TABLET ORAL EVERY 6 HOURS
Refills: 0 | Status: DISCONTINUED | OUTPATIENT
Start: 2023-09-22 | End: 2023-09-23

## 2023-09-22 RX ORDER — PANTOPRAZOLE SODIUM 20 MG/1
40 TABLET, DELAYED RELEASE ORAL
Refills: 0 | Status: DISCONTINUED | OUTPATIENT
Start: 2023-09-23 | End: 2023-09-23

## 2023-09-22 RX ADMIN — Medication 100 MILLIGRAM(S): at 17:00

## 2023-09-22 RX ADMIN — Medication 5 MILLIGRAM(S): at 05:20

## 2023-09-22 RX ADMIN — Medication 25 GRAM(S): at 07:33

## 2023-09-22 RX ADMIN — Medication 1000 MILLIGRAM(S): at 02:00

## 2023-09-22 RX ADMIN — Medication 40 MILLIEQUIVALENT(S): at 07:33

## 2023-09-22 RX ADMIN — CEFTRIAXONE 100 MILLIGRAM(S): 500 INJECTION, POWDER, FOR SOLUTION INTRAMUSCULAR; INTRAVENOUS at 05:23

## 2023-09-22 RX ADMIN — ENOXAPARIN SODIUM 30 MILLIGRAM(S): 100 INJECTION SUBCUTANEOUS at 07:29

## 2023-09-22 RX ADMIN — AMLODIPINE BESYLATE 5 MILLIGRAM(S): 2.5 TABLET ORAL at 10:17

## 2023-09-22 RX ADMIN — Medication 5 MILLIGRAM(S): at 06:17

## 2023-09-22 RX ADMIN — Medication 1000 MILLIGRAM(S): at 00:25

## 2023-09-22 RX ADMIN — Medication 1 TABLET(S): at 05:58

## 2023-09-22 RX ADMIN — Medication 1000 MILLIGRAM(S): at 21:33

## 2023-09-22 RX ADMIN — Medication 1000 MILLIGRAM(S): at 21:09

## 2023-09-22 NOTE — EEG REPORT - NS EEG TEXT BOX
[facility] Department of Neurology  Inpatient Continuous video-Electroencephalogram      Patient Name:	SHIRLEY DUKES    :	1960  MRN:	[id number]    Study Start Date/Time:	2023, 9:03:06 PM  Study End Date/Time:	2023, 7:53 PM    Referred by:  George Angel MD    Brief Clinical History:  SHIRLEY DUKES is a 62 year old Female with thunderclap headache and altered mental status; study performed to investigate for seizures or markers of epilepsy.   Technologist notes: -  Diagnosis Code:  R56.9 convulsions/seizure    Pertinent Medication:  n/a    Acquisition Details:  Electroencephalography was acquired using a minimum of 21 channels on an [employer] system v 9.3.1 with electrode placement according to the standard International 10-20 system following ACNS (American Clinical Neurophysiology Society) guidelines.  Anterior temporal T1 and T2 electrodes were utilized whenever possible.  The [employer] automated spike & seizure detections were all reviewed in detail, in addition to the entire raw EEG.    Findings:  Day 1:  2023, 9:03:06 PM to next morning at 07:00 AM   Background:  continuous, with predominantly alpha and beta frequencies.  Generalized Slowing:  None  Symmetry/Focality: No persistent asymmetries of voltage or frequency.     Voltage:  Normal (20+ uV)  Organization:  Appropriate anterior-posterior gradient  Posterior Dominant Rhythm:  12 Hz obscured by excess muscle artifact  Sleep:  Symmetric, synchronous spindles and K complexes.  Variability:   Yes		Reactivity:  Yes    Spontaneous Activity:  No epileptiform discharges   Events:  1)	No electrographic seizures or significant clinical events occurred during this study.  Provocations:  •	Hyperventilation: was not performed.  •	Photic stimulation: was not performed.  Daily Summary:    1)	There were no findings of active epilepsy, however this alone does not rule out the diagnosis.       Edison Pang MD  Attending Neurologist, [facility]        Daily Updates:  Day 2   Sep 21 07:00 to 7:53 PM   Pertinent medications:   Background:  continuous, with predominantly alpha and beta frequencies.  Generalized Slowing:  None  Symmetry/Focality: No persistent asymmetries of voltage or frequency.     Voltage:  Normal (20+ uV)  Organization:  Appropriate anterior-posterior gradient  Posterior Dominant Rhythm:  7-8 Hz symmetric, well-organized, and well-modulated  Sleep:  Symmetric, synchronous spindles and K complexes.  Variability:   Yes		Reactivity:  Yes    Spontaneous Activity:  No epileptiform discharges   Events:  1)	No electrographic seizures or significant clinical events occurred during this study.  Provocations:  •	Hyperventilation: was not performed.  •	Photic stimulation: was not performed.  Daily Summary:    1)	There was mild diffuse slowing of background activity  2)	There were no findings of active epilepsy, however this alone does not rule out the diagnosis.       Edison Pang MD  Attending Neurologist, [facility]        FINAL Impression:  Abnormal Continuous/long-term video-EEG  1)	Mild background slowing on day 1, with improvement on day 2.      Final Clinical Correlation:  1)	There were indicators of mild diffuse or multifocal cerebral dysfunction.  2)	There were no findings of active epilepsy, however this alone does not rule out the diagnosis.      Edison Pang MD  Attending Neurologist, [facility]

## 2023-09-22 NOTE — PROGRESS NOTE ADULT - PROBLEM SELECTOR PLAN 1
Etiology: hypertensive encephalopathy, less likely of infectious/inflammatory etiology "The fact that patient is awake alert and coherent now with normal blood pressure is reassuring"  - Community-acquired meningoencephalitis. Would favor a viral etiology given normal WBC on presentation, lack of neutrophils in CSF, high CSF glucose and negative CSF PCR. CSF PCR negative thus far.  Patient presented with thunderclap headache, blurry vision, AMS and hypertension. Differentials include Hypertensive emergency vs Pres vs meningitis. UTox negative. LP performed on 09/18, CSF protein 79, glucose 75- otherwise unremarkable. MRI indicative of atypical PRES vs. toxic metabolic causes of AMS. 9/21, patient A&Ox3.   - Trauma to the head 3 days prior w/o changes on CT imaging, blood and CSF cultures with no growth to date, d/c acyclovir and LR ,  Studies negative: vitamin B1, B6, folate, B12, vitamin D 25-OH, vitamin E, TSH, RPR, HIV  - 9/20: MRI Ovoid areas of signal abnormality in the bilateral cerebellar hemispheres. This may represent atypical PRES versus toxic metabolic etiology. Infection is less likely given lack of restricted diffusion or enhancement,   plan  - Per ID: stop Acyclovir given negative HSV 1/2 CSF PCR,  Continue Ceftriaxone 2g IV q12hrs. If Lyme antibody is negative and CSF cultures remains NGTD on 9/22/23, ok to stop Ceftriaxone on 9/22/23, Add onto CSF: [place] state encephalitis panel CTX 2g IV BID (for lyme coverage)  - Neuro:  Can discontinue EEG Provide strong environmental cues to maintain normal sleep/wake cycle; Daytime - frequent verbal engagement and reorientation, full light in room, encourage family visits, make sure patient has seeing eyeglasses/hearing aids; NightTime - reduce light noise, avoid non-essential procedures between midnight and 6AM., F/u CSF studies (anti-NMDA receptor encephalitis, [place] encephalitis panel, WNV, coxsackie, echovirus, human parechovirus, HIV, EBV) Etiology: hypertensive encephalopathy, less likely of infectious/inflammatory etiology "The fact that patient is awake alert and coherent now with normal blood pressure is reassuring"  - Community-acquired meningoencephalitis. Would favor a viral etiology given normal WBC on presentation, lack of neutrophils in CSF, high CSF glucose and negative CSF PCR. CSF PCR negative thus far.  Patient presented with thunderclap headache, blurry vision, AMS and hypertension. Differentials include Hypertensive emergency vs Pres vs meningitis. UTox negative. LP performed on 09/18, CSF protein 79, glucose 75- otherwise unremarkable. MRI indicative of atypical PRES vs. toxic metabolic causes of AMS. 9/21, patient A&Ox3.   - Trauma to the head 3 days prior w/o changes on CT imaging, blood and CSF cultures with no growth to date, d/c acyclovir and LR ,  Studies negative: vitamin B1, B6, folate, B12, vitamin D 25-OH, vitamin E, TSH, RPR, HIV  - 9/20: MRI Ovoid areas of signal abnormality in the bilateral cerebellar hemispheres. This may represent atypical PRES versus toxic metabolic etiology. Infection is less likely given lack of restricted diffusion or enhancement,   plan  - Per ID: Can stop Ceftriaxone at this time given low suspicion for bacterial process, Monitor clinically, Follow up Lyme antibody  - Neuro:  Can discontinue EEG Provide strong environmental cues to maintain normal sleep/wake cycle; Daytime - frequent verbal engagement and reorientation, full light in room, encourage family visits, make sure patient has seeing eyeglasses/hearing aids; NightTime - reduce light noise, avoid non-essential procedures between midnight and 6AM.  - PT consult

## 2023-09-22 NOTE — PROGRESS NOTE ADULT - ASSESSMENT
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 Would favor a viral etiology given normal WBC on presentation, lack of neutrophils in CSF, high CSF glucose and negative CSF PCR. CSF PCR negative thus far.  PRES syndrome is also a possibility      Recommend:  1.  Can stop Ceftriaxone at this time given low suspicion for bacterial process  2.  Monitor clinically  3.  Follow up Lyme antibody      ID team 1 will sign off.  Reconsult as needed or if Lyme antibody is positive

## 2023-09-22 NOTE — PROGRESS NOTE ADULT - ASSESSMENT
62F presents w/ AMS, elevated /106 w/ HA, and blurry vision likely 2/2 to HTN emergency improved on Nicardipine drip but continues to be altered. Patient was intubated and sedated 9/18 for LP. LP results unremarkable to date. MRI 9/20 indicative of atypical PRES vs. toxic metabolic causes of AMS.

## 2023-09-22 NOTE — PROGRESS NOTE ADULT - SUBJECTIVE AND OBJECTIVE BOX
OVERNIGHT EVENTS: No acute overnight events.     Subjective: Pt seen in AM at bedside, resting comfortably in bed, and does not appear to be in any acute distress. When asked, pt denies any recent or active fever, chills, nausea, vomiting, headache, acute sob, chest pain, abdominal pain, genitourinary sx, extremity pain or swelling.    VITAL SIGNS:  Vital Signs Last 24 Hrs  T(C): 37.3 (22 Sep 2023 01:00), Max: 37.8 (21 Sep 2023 20:45)  T(F): 99.2 (22 Sep 2023 01:00), Max: 100.1 (21 Sep 2023 20:45)  HR: 66 (22 Sep 2023 06:10) (58 - 92)  BP: 205/91 (22 Sep 2023 06:10) (126/67 - 205/91)  BP(mean): 130 (22 Sep 2023 06:10) (89 - 136)  RR: 18 (22 Sep 2023 06:10) (16 - 35)  SpO2: 98% (22 Sep 2023 06:10) (92% - 99%)    Parameters below as of 22 Sep 2023 06:10  Patient On (Oxygen Delivery Method): room air        PHYSICAL EXAM:  General: NAD; speaking in full sentences  HEENT: NC/AT; PERRL; EOMI; MMM  Neck: supple; no JVD  Cardiac: RRR; +S1/S2  Pulm: CTA B/L; no W/R/R  GI: soft, NT/ND, +BS  Extremities: WWP; no edema, clubbing or cyanosis  Vasc: 2+ radial, DP pulses B/L  Neuro: AAOx3; no focal deficits    MEDICATIONS:  MEDICATIONS  (STANDING):  cefTRIAXone   IVPB 2000 milliGRAM(s) IV Intermittent every 12 hours  enoxaparin Injectable 30 milliGRAM(s) SubCutaneous every 24 hours  metoprolol succinate  milliGRAM(s) Oral every 24 hours  pantoprazole  Injectable 40 milliGRAM(s) IV Push daily  triamterene 37.5 mG/hydrochlorothiazide 25 mG Tablet 1 Tablet(s) Oral daily    MEDICATIONS  (PRN):  acetaminophen     Tablet .. 1000 milliGRAM(s) Oral every 6 hours PRN Temp greater or equal to 38C (100.4F), Mild Pain (1 - 3)  LORazepam   Injectable 2 milliGRAM(s) IV Push every 1 hour PRN CIWA-Ar score 8 or greater      ALLERGIES:  Allergies    No Known Allergies    Intolerances        LABS:                        13.5   8.88  )-----------( 189      ( 22 Sep 2023 06:05 )             42.0     09-21    139  |  106  |  8   ----------------------------<  131<H>  3.1<L>   |  26  |  0.57    Ca    8.7      21 Sep 2023 05:20  Phos  2.3     09-21  Mg     1.9     09-21    TPro  7.1  /  Alb  2.9<L>  /  TBili  0.4  /  DBili  x   /  AST  16  /  ALT  12  /  AlkPhos  91  09-21        RADIOLOGY & ADDITIONAL TESTS: Reviewed. OVERNIGHT EVENTS: o/n: 7pm BS 150ccs. BP's in 180's, 5mg IV Hydral given. 5AM BS <50cc. Dc'd bladder scans. Additional 5mg Hydral given for systolic 200    Subjective: Pt seen in AM at bedside, resting comfortably in bed, and does not appear to be in any acute distress. When asked, pt denies any recent or active fever, chills, nausea, vomiting, headache, acute sob, chest pain, abdominal pain, genitourinary sx, extremity pain or swelling.    VITAL SIGNS:  Vital Signs Last 24 Hrs  T(C): 37.3 (22 Sep 2023 01:00), Max: 37.8 (21 Sep 2023 20:45)  T(F): 99.2 (22 Sep 2023 01:00), Max: 100.1 (21 Sep 2023 20:45)  HR: 66 (22 Sep 2023 06:10) (58 - 92)  BP: 205/91 (22 Sep 2023 06:10) (126/67 - 205/91)  BP(mean): 130 (22 Sep 2023 06:10) (89 - 136)  RR: 18 (22 Sep 2023 06:10) (16 - 35)  SpO2: 98% (22 Sep 2023 06:10) (92% - 99%)    Parameters below as of 22 Sep 2023 06:10  Patient On (Oxygen Delivery Method): room air        PHYSICAL EXAM:  General: NAD; speaking in full sentences  HEENT: NC/AT; PERRL; EOMI; MMM  Neck: supple; no JVD  Cardiac: RRR; +S1/S2  Pulm: CTA B/L; no W/R/R  GI: soft, NT/ND, +BS  Extremities: WWP; no edema, clubbing or cyanosis  Vasc: 2+ radial, DP pulses B/L  Neuro: AAOx3; no focal deficits    MEDICATIONS:  MEDICATIONS  (STANDING):  cefTRIAXone   IVPB 2000 milliGRAM(s) IV Intermittent every 12 hours  enoxaparin Injectable 30 milliGRAM(s) SubCutaneous every 24 hours  metoprolol succinate  milliGRAM(s) Oral every 24 hours  pantoprazole  Injectable 40 milliGRAM(s) IV Push daily  triamterene 37.5 mG/hydrochlorothiazide 25 mG Tablet 1 Tablet(s) Oral daily    MEDICATIONS  (PRN):  acetaminophen     Tablet .. 1000 milliGRAM(s) Oral every 6 hours PRN Temp greater or equal to 38C (100.4F), Mild Pain (1 - 3)  LORazepam   Injectable 2 milliGRAM(s) IV Push every 1 hour PRN CIWA-Ar score 8 or greater      ALLERGIES:  Allergies    No Known Allergies    Intolerances        LABS:                        13.5   8.88  )-----------( 189      ( 22 Sep 2023 06:05 )             42.0     09-21    139  |  106  |  8   ----------------------------<  131<H>  3.1<L>   |  26  |  0.57    Ca    8.7      21 Sep 2023 05:20  Phos  2.3     09-21  Mg     1.9     09-21    TPro  7.1  /  Alb  2.9<L>  /  TBili  0.4  /  DBili  x   /  AST  16  /  ALT  12  /  AlkPhos  91  09-21        RADIOLOGY & ADDITIONAL TESTS: Reviewed.

## 2023-09-22 NOTE — PROGRESS NOTE ADULT - TIME BILLING
evaluation for meningoencephalitis
treatment and evaluation of meningoencephalitis
treatment and evaluation of meningoencephalitis

## 2023-09-22 NOTE — SBIRT NOTE ADULT - NSSBIRTBRIEFINTDET_GEN_A_CORE
SW completed SBIRT with patient, who endorsed occasional alcohol use (pura w/ water) when out to dinner with friends. Last reported drink last Gonzalo 9/10. Patient reports no concern for alcohol use.

## 2023-09-22 NOTE — PROGRESS NOTE ADULT - PROBLEM SELECTOR PLAN 2
Treated in ED w/ 2 runs of Nicardipine drip 25mL/hr, 1 10mg Labetalol IVP, goal of 20% reduction. BP has since normalized to SBP 130s-180s, nicardipine drip stopped, no EKG changes  plan  - Continue home triamterene 37.5mg/hydrochlorothiazide 25mg qday  - Continue with Metoprolol succinate 100 mg q day Treated in ED w/ 2 runs of Nicardipine drip 25mL/hr, 1 10mg Labetalol IVP, goal of 20% reduction. BP has since normalized to SBP 130s-180s, nicardipine drip stopped, no EKG changes  plan  - Continue home triamterene 37.5mg/hydrochlorothiazide 25mg qday  - Continue with Metoprolol succinate 100 mg q day  - Amlodipine 5 mg started

## 2023-09-22 NOTE — PROGRESS NOTE ADULT - SUBJECTIVE AND OBJECTIVE BOX
INTERVAL HPI/OVERNIGHT EVENTS:    Patient was seen and examined at bedside.  Reports feeling well    CONSTITUTIONAL:  Negative fever or chills, feels well, good appetite  EYES:  Negative  blurry vision or double vision  CARDIOVASCULAR:  Negative for chest pain or palpitations  RESPIRATORY:  Negative for cough, wheezing, or SOB   GASTROINTESTINAL:  Negative for nausea, vomiting, diarrhea, constipation, or abdominal pain  GENITOURINARY:  Negative frequency, urgency or dysuria  NEUROLOGIC:  No headache, confusion, dizziness, lightheadedness      ANTIBIOTICS/RELEVANT:    MEDICATIONS  (STANDING):  amLODIPine   Tablet 5 milliGRAM(s) Oral every 24 hours  cefTRIAXone   IVPB 2000 milliGRAM(s) IV Intermittent every 12 hours  enoxaparin Injectable 30 milliGRAM(s) SubCutaneous every 24 hours  metoprolol succinate  milliGRAM(s) Oral every 24 hours  triamterene 37.5 mG/hydrochlorothiazide 25 mG Tablet 1 Tablet(s) Oral daily    MEDICATIONS  (PRN):  acetaminophen     Tablet .. 1000 milliGRAM(s) Oral every 6 hours PRN Temp greater or equal to 38C (100.4F), Mild Pain (1 - 3)  LORazepam   Injectable 2 milliGRAM(s) IV Push every 1 hour PRN CIWA-Ar score 8 or greater        Vital Signs Last 24 Hrs  T(C): 37.1 (22 Sep 2023 09:38), Max: 37.8 (21 Sep 2023 20:45)  T(F): 98.8 (22 Sep 2023 09:38), Max: 100.1 (21 Sep 2023 20:45)  HR: 88 (22 Sep 2023 08:41) (64 - 92)  BP: 196/88 (22 Sep 2023 08:41) (132/87 - 205/91)  BP(mean): 127 (22 Sep 2023 08:41) (94 - 136)  RR: 18 (22 Sep 2023 08:41) (17 - 35)  SpO2: 98% (22 Sep 2023 08:41) (92% - 99%)    Parameters below as of 22 Sep 2023 08:41  Patient On (Oxygen Delivery Method): room air        PHYSICAL EXAM:  Constitutional: non-toxic, no distress  Eyes:HOANG, EOMI  Ear/Nose/Throat: no oral lesion, no sinus tenderness on percussion	  Neck:  supple  Respiratory: CTA gala  Cardiovascular: S1S2 RRR, no murmurs  Gastrointestinal:soft, (+) BS, no HSM  Extremities:no e/e/c  Vascular: DP Pulse:	right normal; left normal      LABS:                        13.5   8.88  )-----------( 189      ( 22 Sep 2023 06:05 )             42.0     09-22    137  |  103  |  6<L>  ----------------------------<  91  3.6   |  25  |  0.67    Ca    9.2      22 Sep 2023 06:05  Phos  2.4     09-22  Mg     1.8     09-22    TPro  7.3  /  Alb  3.1<L>  /  TBili  0.4  /  DBili  x   /  AST  17  /  ALT  13  /  AlkPhos  98  09-22      Urinalysis Basic - ( 22 Sep 2023 06:05 )    Color: x / Appearance: x / SG: x / pH: x  Gluc: 91 mg/dL / Ketone: x  / Bili: x / Urobili: x   Blood: x / Protein: x / Nitrite: x   Leuk Esterase: x / RBC: x / WBC x   Sq Epi: x / Non Sq Epi: x / Bacteria: x        MICROBIOLOGY:    Culture - Acid Fast - CSF (09.18.23 @ 22:40)    Specimen Source: .CSF shunt   Acid Fast Bacilli Smear:   Less than 5 cc of CSF received,  unable to perform AFB smear.   Culture Results:   Culture is being performed.        Culture - CSF with Gram Stain (09.18.23 @ 22:40)    Gram Stain:   No organisms seen  Rare WBC's   Specimen Source: .CSF csf fluid   Culture Results:   No growth to date    RADIOLOGY & ADDITIONAL STUDIES:

## 2023-09-22 NOTE — PROGRESS NOTE ADULT - PROBLEM SELECTOR PLAN 5
Fluids: none  Electrolytes: replete as needed  Nutrition: regular diet (passed bedside dysphagia)   GI: pantoprazole 40mg IV daily   DVT: Lovenox 30mg subQ daily   Dispo: tele Fluids: none  Electrolytes: replete as needed  Nutrition: regular diet   GI: pantoprazole 40mg IV daily   DVT: Lovenox 30mg subQ daily   Dispo: tele

## 2023-09-23 LAB
ALBUMIN SERPL ELPH-MCNC: 3.3 G/DL — SIGNIFICANT CHANGE UP (ref 3.3–5)
ALP SERPL-CCNC: 99 U/L — SIGNIFICANT CHANGE UP (ref 40–120)
ALT FLD-CCNC: 17 U/L — SIGNIFICANT CHANGE UP (ref 10–45)
ANION GAP SERPL CALC-SCNC: 10 MMOL/L — SIGNIFICANT CHANGE UP (ref 5–17)
AST SERPL-CCNC: 23 U/L — SIGNIFICANT CHANGE UP (ref 10–40)
BASOPHILS # BLD AUTO: 0.06 K/UL — SIGNIFICANT CHANGE UP (ref 0–0.2)
BASOPHILS NFR BLD AUTO: 0.9 % — SIGNIFICANT CHANGE UP (ref 0–2)
BILIRUB SERPL-MCNC: 0.4 MG/DL — SIGNIFICANT CHANGE UP (ref 0.2–1.2)
BUN SERPL-MCNC: 9 MG/DL — SIGNIFICANT CHANGE UP (ref 7–23)
CALCIUM SERPL-MCNC: 9.5 MG/DL — SIGNIFICANT CHANGE UP (ref 8.4–10.5)
CHLORIDE SERPL-SCNC: 101 MMOL/L — SIGNIFICANT CHANGE UP (ref 96–108)
CO2 SERPL-SCNC: 22 MMOL/L — SIGNIFICANT CHANGE UP (ref 22–31)
CREAT SERPL-MCNC: 0.64 MG/DL — SIGNIFICANT CHANGE UP (ref 0.5–1.3)
EGFR: 99 ML/MIN/1.73M2 — SIGNIFICANT CHANGE UP
EOSINOPHIL # BLD AUTO: 0.1 K/UL — SIGNIFICANT CHANGE UP (ref 0–0.5)
EOSINOPHIL NFR BLD AUTO: 1.5 % — SIGNIFICANT CHANGE UP (ref 0–6)
GLUCOSE BLDC GLUCOMTR-MCNC: 96 MG/DL — SIGNIFICANT CHANGE UP (ref 70–99)
GLUCOSE SERPL-MCNC: 89 MG/DL — SIGNIFICANT CHANGE UP (ref 70–99)
HCT VFR BLD CALC: 44.8 % — SIGNIFICANT CHANGE UP (ref 34.5–45)
HGB BLD-MCNC: 14.2 G/DL — SIGNIFICANT CHANGE UP (ref 11.5–15.5)
IMM GRANULOCYTES NFR BLD AUTO: 0.5 % — SIGNIFICANT CHANGE UP (ref 0–0.9)
LYMPHOCYTES # BLD AUTO: 1.73 K/UL — SIGNIFICANT CHANGE UP (ref 1–3.3)
LYMPHOCYTES # BLD AUTO: 26.3 % — SIGNIFICANT CHANGE UP (ref 13–44)
MAGNESIUM SERPL-MCNC: 2 MG/DL — SIGNIFICANT CHANGE UP (ref 1.6–2.6)
MCHC RBC-ENTMCNC: 25.8 PG — LOW (ref 27–34)
MCHC RBC-ENTMCNC: 31.7 GM/DL — LOW (ref 32–36)
MCV RBC AUTO: 81.3 FL — SIGNIFICANT CHANGE UP (ref 80–100)
MONOCYTES # BLD AUTO: 0.68 K/UL — SIGNIFICANT CHANGE UP (ref 0–0.9)
MONOCYTES NFR BLD AUTO: 10.4 % — SIGNIFICANT CHANGE UP (ref 2–14)
NEUTROPHILS # BLD AUTO: 3.97 K/UL — SIGNIFICANT CHANGE UP (ref 1.8–7.4)
NEUTROPHILS NFR BLD AUTO: 60.4 % — SIGNIFICANT CHANGE UP (ref 43–77)
NRBC # BLD: 0 /100 WBCS — SIGNIFICANT CHANGE UP (ref 0–0)
PHOSPHATE SERPL-MCNC: 3.3 MG/DL — SIGNIFICANT CHANGE UP (ref 2.5–4.5)
PLATELET # BLD AUTO: 224 K/UL — SIGNIFICANT CHANGE UP (ref 150–400)
POTASSIUM SERPL-MCNC: 4.1 MMOL/L — SIGNIFICANT CHANGE UP (ref 3.5–5.3)
POTASSIUM SERPL-SCNC: 4.1 MMOL/L — SIGNIFICANT CHANGE UP (ref 3.5–5.3)
PROT SERPL-MCNC: 7.8 G/DL — SIGNIFICANT CHANGE UP (ref 6–8.3)
RBC # BLD: 5.51 M/UL — HIGH (ref 3.8–5.2)
RBC # FLD: 18.6 % — HIGH (ref 10.3–14.5)
SODIUM SERPL-SCNC: 133 MMOL/L — LOW (ref 135–145)
WBC # BLD: 6.57 K/UL — SIGNIFICANT CHANGE UP (ref 3.8–10.5)
WBC # FLD AUTO: 6.57 K/UL — SIGNIFICANT CHANGE UP (ref 3.8–10.5)

## 2023-09-23 PROCEDURE — 84156 ASSAY OF PROTEIN URINE: CPT

## 2023-09-23 PROCEDURE — 83605 ASSAY OF LACTIC ACID: CPT

## 2023-09-23 PROCEDURE — 86592 SYPHILIS TEST NON-TREP QUAL: CPT

## 2023-09-23 PROCEDURE — 0225U NFCT DS DNA&RNA 21 SARSCOV2: CPT

## 2023-09-23 PROCEDURE — 70553 MRI BRAIN STEM W/O & W/DYE: CPT

## 2023-09-23 PROCEDURE — 70450 CT HEAD/BRAIN W/O DYE: CPT

## 2023-09-23 PROCEDURE — 84100 ASSAY OF PHOSPHORUS: CPT

## 2023-09-23 PROCEDURE — 84157 ASSAY OF PROTEIN OTHER: CPT

## 2023-09-23 PROCEDURE — 85027 COMPLETE CBC AUTOMATED: CPT

## 2023-09-23 PROCEDURE — 82570 ASSAY OF URINE CREATININE: CPT

## 2023-09-23 PROCEDURE — 86403 PARTICLE AGGLUT ANTBDY SCRN: CPT

## 2023-09-23 PROCEDURE — 80061 LIPID PANEL: CPT

## 2023-09-23 PROCEDURE — 87799 DETECT AGENT NOS DNA QUANT: CPT

## 2023-09-23 PROCEDURE — 84145 PROCALCITONIN (PCT): CPT

## 2023-09-23 PROCEDURE — 83735 ASSAY OF MAGNESIUM: CPT

## 2023-09-23 PROCEDURE — 84166 PROTEIN E-PHORESIS/URINE/CSF: CPT

## 2023-09-23 PROCEDURE — 96374 THER/PROPH/DIAG INJ IV PUSH: CPT

## 2023-09-23 PROCEDURE — 71045 X-RAY EXAM CHEST 1 VIEW: CPT

## 2023-09-23 PROCEDURE — 88108 CYTOPATH CONCENTRATE TECH: CPT

## 2023-09-23 PROCEDURE — 82945 GLUCOSE OTHER FLUID: CPT

## 2023-09-23 PROCEDURE — 87205 SMEAR GRAM STAIN: CPT

## 2023-09-23 PROCEDURE — 96375 TX/PRO/DX INJ NEW DRUG ADDON: CPT

## 2023-09-23 PROCEDURE — 87040 BLOOD CULTURE FOR BACTERIA: CPT

## 2023-09-23 PROCEDURE — 89051 BODY FLUID CELL COUNT: CPT

## 2023-09-23 PROCEDURE — 87635 SARS-COV-2 COVID-19 AMP PRB: CPT

## 2023-09-23 PROCEDURE — 36415 COLL VENOUS BLD VENIPUNCTURE: CPT

## 2023-09-23 PROCEDURE — 87102 FUNGUS ISOLATION CULTURE: CPT

## 2023-09-23 PROCEDURE — 86658 ENTEROVIRUS ANTIBODY: CPT

## 2023-09-23 PROCEDURE — 95700 EEG CONT REC W/VID EEG TECH: CPT

## 2023-09-23 PROCEDURE — 83615 LACTATE (LD) (LDH) ENZYME: CPT

## 2023-09-23 PROCEDURE — 87116 MYCOBACTERIA CULTURE: CPT

## 2023-09-23 PROCEDURE — 86803 HEPATITIS C AB TEST: CPT

## 2023-09-23 PROCEDURE — 82962 GLUCOSE BLOOD TEST: CPT

## 2023-09-23 PROCEDURE — 88305 TISSUE EXAM BY PATHOLOGIST: CPT

## 2023-09-23 PROCEDURE — 84484 ASSAY OF TROPONIN QUANT: CPT

## 2023-09-23 PROCEDURE — 99285 EMERGENCY DEPT VISIT HI MDM: CPT | Mod: 25

## 2023-09-23 PROCEDURE — 83935 ASSAY OF URINE OSMOLALITY: CPT

## 2023-09-23 PROCEDURE — 80202 ASSAY OF VANCOMYCIN: CPT

## 2023-09-23 PROCEDURE — A9585: CPT

## 2023-09-23 PROCEDURE — 81001 URINALYSIS AUTO W/SCOPE: CPT

## 2023-09-23 PROCEDURE — 84300 ASSAY OF URINE SODIUM: CPT

## 2023-09-23 PROCEDURE — 70450 CT HEAD/BRAIN W/O DYE: CPT | Mod: 26

## 2023-09-23 PROCEDURE — 99232 SBSQ HOSP IP/OBS MODERATE 35: CPT | Mod: GC

## 2023-09-23 PROCEDURE — 87070 CULTURE OTHR SPECIMN AEROBIC: CPT

## 2023-09-23 PROCEDURE — 87529 HSV DNA AMP PROBE: CPT

## 2023-09-23 PROCEDURE — 82140 ASSAY OF AMMONIA: CPT

## 2023-09-23 PROCEDURE — 86788 WEST NILE VIRUS AB IGM: CPT

## 2023-09-23 PROCEDURE — 85025 COMPLETE CBC W/AUTO DIFF WBC: CPT

## 2023-09-23 PROCEDURE — 87483 CNS DNA AMP PROBE TYPE 12-25: CPT

## 2023-09-23 PROCEDURE — 87476 LYME DIS DNA AMP PROBE: CPT

## 2023-09-23 PROCEDURE — 94002 VENT MGMT INPAT INIT DAY: CPT

## 2023-09-23 PROCEDURE — 86789 WEST NILE VIRUS ANTIBODY: CPT

## 2023-09-23 PROCEDURE — 87798 DETECT AGENT NOS DNA AMP: CPT

## 2023-09-23 PROCEDURE — 80053 COMPREHEN METABOLIC PANEL: CPT

## 2023-09-23 PROCEDURE — 95708 EEG WO VID EA 12-26HR UNMNTR: CPT

## 2023-09-23 PROCEDURE — 80307 DRUG TEST PRSMV CHEM ANLYZR: CPT

## 2023-09-23 RX ORDER — TRIAMTERENE/HYDROCHLOROTHIAZID 75 MG-50MG
1 TABLET ORAL
Qty: 30 | Refills: 2
Start: 2023-09-23

## 2023-09-23 RX ORDER — HYDRALAZINE HCL 50 MG
5 TABLET ORAL EVERY 6 HOURS
Refills: 0 | Status: DISCONTINUED | OUTPATIENT
Start: 2023-09-23 | End: 2023-09-23

## 2023-09-23 RX ORDER — QUETIAPINE FUMARATE 200 MG/1
12.5 TABLET, FILM COATED ORAL ONCE
Refills: 0 | Status: DISCONTINUED | OUTPATIENT
Start: 2023-09-23 | End: 2023-09-23

## 2023-09-23 RX ORDER — TRIAMTERENE/HYDROCHLOROTHIAZID 75 MG-50MG
0 TABLET ORAL
Qty: 0 | Refills: 0 | DISCHARGE

## 2023-09-23 RX ORDER — AMLODIPINE BESYLATE 2.5 MG/1
5 TABLET ORAL ONCE
Refills: 0 | Status: COMPLETED | OUTPATIENT
Start: 2023-09-23 | End: 2023-09-23

## 2023-09-23 RX ORDER — AMLODIPINE BESYLATE 2.5 MG/1
1 TABLET ORAL
Qty: 30 | Refills: 2
Start: 2023-09-23

## 2023-09-23 RX ORDER — METOPROLOL TARTRATE 50 MG
1 TABLET ORAL
Qty: 30 | Refills: 2
Start: 2023-09-23

## 2023-09-23 RX ORDER — METOPROLOL TARTRATE 50 MG
0 TABLET ORAL
Qty: 0 | Refills: 0 | DISCHARGE

## 2023-09-23 RX ORDER — AMLODIPINE BESYLATE 2.5 MG/1
10 TABLET ORAL DAILY
Refills: 0 | Status: CANCELLED | OUTPATIENT
Start: 2023-09-24 | End: 2023-09-23

## 2023-09-23 RX ADMIN — Medication 5 MILLIGRAM(S): at 05:30

## 2023-09-23 RX ADMIN — Medication 1 TABLET(S): at 07:13

## 2023-09-23 RX ADMIN — PANTOPRAZOLE SODIUM 40 MILLIGRAM(S): 20 TABLET, DELAYED RELEASE ORAL at 07:13

## 2023-09-23 RX ADMIN — AMLODIPINE BESYLATE 5 MILLIGRAM(S): 2.5 TABLET ORAL at 17:25

## 2023-09-23 RX ADMIN — ENOXAPARIN SODIUM 30 MILLIGRAM(S): 100 INJECTION SUBCUTANEOUS at 07:13

## 2023-09-23 RX ADMIN — AMLODIPINE BESYLATE 5 MILLIGRAM(S): 2.5 TABLET ORAL at 08:57

## 2023-09-23 RX ADMIN — Medication 100 MILLIGRAM(S): at 17:25

## 2023-09-23 NOTE — PROGRESS NOTE ADULT - ATTENDING COMMENTS
Patient fell. Underwent CTH which was negative.  On my evaluation she was confused as to where she was, thinking she was renting an apartment to me. No focal deficits.  WNV Igg positive, but IgM negative.  Continue supportive care.

## 2023-09-23 NOTE — PROGRESS NOTE ADULT - PROBLEM SELECTOR PLAN 5
Fluids: none  Electrolytes: replete as needed  Nutrition: regular diet   GI: pantoprazole 40mg IV daily   DVT: Lovenox 30mg subQ daily   Dispo: tele

## 2023-09-23 NOTE — PROGRESS NOTE ADULT - PROBLEM SELECTOR PLAN 1
Etiology: hypertensive encephalopathy, less likely of infectious/inflammatory etiology "The fact that patient is awake alert and coherent now with normal blood pressure is reassuring"  - Community-acquired meningoencephalitis. Would favor a viral etiology given normal WBC on presentation, lack of neutrophils in CSF, high CSF glucose and negative CSF PCR. CSF PCR negative thus far.  Patient presented with thunderclap headache, blurry vision, AMS and hypertension. Differentials include Hypertensive emergency vs Pres vs meningitis. UTox negative. LP performed on 09/18, CSF protein 79, glucose 75- otherwise unremarkable. MRI indicative of atypical PRES vs. toxic metabolic causes of AMS. 9/21, patient A&Ox3.   - Trauma to the head 3 days prior w/o changes on CT imaging, blood and CSF cultures with no growth to date, d/c acyclovir and LR ,  Studies negative: vitamin B1, B6, folate, B12, vitamin D 25-OH, vitamin E, TSH, RPR, HIV  - 9/20: MRI Ovoid areas of signal abnormality in the bilateral cerebellar hemispheres. This may represent atypical PRES versus toxic metabolic etiology. Infection is less likely given lack of restricted diffusion or enhancement, lyme disease +  plan  - Per ID: Can stop Ceftriaxone at this time given low suspicion for bacterial process, Monitor clinically, Follow up Lyme antibody  - Neuro:  Can discontinue EEG Provide strong environmental cues to maintain normal sleep/wake cycle; Daytime - frequent verbal engagement and reorientation, full light in room, encourage family visits, make sure patient has seeing eyeglasses/hearing aids; NightTime - reduce light noise, avoid non-essential procedures between midnight and 6AM.  - PT consult Etiology: Lyme disease vs hypertensive encephalopathy, less likely of infectious/inflammatory etiology "The fact that patient is awake alert and coherent now with normal blood pressure is reassuring"  - Community-acquired meningoencephalitis. Would favor a viral etiology given normal WBC on presentation, lack of neutrophils in CSF, high CSF glucose and negative CSF PCR. CSF PCR negative thus far.  Patient presented with thunderclap headache, blurry vision, AMS and hypertension. Differentials include Hypertensive emergency vs Pres vs meningitis. UTox negative. LP performed on 09/18, CSF protein 79, glucose 75- otherwise unremarkable. MRI indicative of atypical PRES vs. toxic metabolic causes of AMS. 9/21, patient A&Ox3.   - Trauma to the head 3 days prior w/o changes on CT imaging, blood and CSF cultures with no growth to date, d/c acyclovir and LR ,  Studies negative: vitamin B1, B6, folate, B12, vitamin D 25-OH, vitamin E, TSH, RPR, HIV  - 9/20: MRI Ovoid areas of signal abnormality in the bilateral cerebellar hemispheres. This may represent atypical PRES versus toxic metabolic etiology. Infection is less likely given lack of restricted diffusion or enhancement, lyme disease +  - 9/23 Patient has witnessed fall: CTH: Negative, EKG WNL, BMP/CBC, glucose WNL  plan  - Per ID: Can stop Ceftriaxone at this time given low suspicion for bacterial process, monitor clinically, Follow up Lyme antibody  - Neuro: Can discontinue EEG Provide strong environmental cues to maintain normal sleep/wake cycle; Daytime - frequent verbal engagement and reorientation, full light in room, encourage family visits, make sure patient has seeing eyeglasses/hearing aids; NightTime reduce light noise, avoid non-essential procedures between midnight and 6AM.  - PT consult: Home PT Etiology: Lyme disease vs hypertensive encephalopathy, less likely of infectious/inflammatory etiology "The fact that patient is awake alert and coherent now with normal blood pressure is reassuring"  - Community-acquired meningoencephalitis. Would favor a viral etiology given normal WBC on presentation, lack of neutrophils in CSF, high CSF glucose and negative CSF PCR. CSF PCR negative thus far.  Patient presented with thunderclap headache, blurry vision, AMS and hypertension. Differentials include Hypertensive emergency vs Pres vs meningitis. UTox negative. LP performed on 09/18, CSF protein 79, glucose 75- otherwise unremarkable. MRI indicative of atypical PRES vs. toxic metabolic causes of AMS. 9/21, patient A&Ox3.   - Trauma to the head 3 days prior w/o changes on CT imaging, blood and CSF cultures with no growth to date, d/c acyclovir and LR ,  Studies negative: vitamin B1, B6, folate, B12, vitamin D 25-OH, vitamin E, TSH, RPR, HIV  - 9/20: MRI Ovoid areas of signal abnormality in the bilateral cerebellar hemispheres. This may represent atypical PRES versus toxic metabolic etiology. Infection is less likely given lack of restricted diffusion or enhancement, lyme disease +  - 9/23 Patient has witnessed fall: CTH: Negative, EKG WNL, BMP/CBC, glucose WNL  plan  - Per ID: Can stop Ceftriaxone at this time given low suspicion for bacterial process, monitor clinically, Follow up Lyme antibody  - Neuro: Can discontinue EEG Provide strong environmental cues to maintain normal sleep/wake cycle; Daytime - frequent verbal engagement and reorientation, full light in room, encourage family visits, make sure patient has seeing eyeglasses/hearing aids; NightTime reduce light noise, avoid non-essential procedures between midnight and 6AM.  - PT consult: pending

## 2023-09-23 NOTE — CHART NOTE - NSCHARTNOTEFT_GEN_A_CORE
At approximately 9PM, [facility] team was made aware by the patient's nurse that the patient wanted to leave AMA. Patient was evaluated at bedside. She stated that she had a lot of things to take care of including laundry and attending to her home and needed to leave. I explained to the patient that since she had a fall earlier in the day it would be best to stay until morning to better control her blood pressure and ensure a safer discharge. Patient stated that she would not be waiting until the morning. Segundo Meneses was then called as patient was leaving the room and headed to the elevator prior to evaluating her medical decision making capacity. At this time patient was willing to sit in a chair and have her IV removed. Explained to the patient that her blood pressure was still poorly controlled and with the possibility of her still having a viral encephalitis, could develop loss of balance and sustain falls, loss of consciousness, bleeding, and potentially death. We also explained the benefits of staying until the morning to facilitate a safer discharge. She stated that she understood the risks of leaving and was able to verbalize them back. She understood the reason why we wanted to keep her until the morning but stated she had too much to get done to wait until tomorrow. She stated that she would call a cab and go straight home where her brother would meet her to help facilitate her safely getting home. We then called her brother, Mr. Wale Martinez, who also spoke to the patient and confirmed the plan. Patient deemed to have capacity and signed out AMA, form filled and placed in chart.

## 2023-09-23 NOTE — PROGRESS NOTE ADULT - SUBJECTIVE AND OBJECTIVE BOX
SUBJECTIVE/OVERNIGHT EVENTS: hydral 5mg IV given for systolics >180    Subjective: Pt seen in AM at bedside, resting comfortably in bed, and does not appear to be in any acute distress. When asked, pt denies any recent or active fever, chills, nausea, vomiting, headache, acute sob, chest pain, abdominal pain, genitourinary sx, extremity pain or swelling.    VITAL SIGNS:  Vital Signs Last 24 Hrs  T(C): 37.1 (23 Sep 2023 06:00), Max: 37.4 (22 Sep 2023 13:36)  T(F): 98.7 (23 Sep 2023 06:00), Max: 99.3 (22 Sep 2023 13:36)  HR: 60 (23 Sep 2023 05:00) (58 - 88)  BP: 182/89 (23 Sep 2023 05:00) (149/93 - 197/84)  BP(mean): 126 (23 Sep 2023 05:00) (110 - 145)  RR: 20 (23 Sep 2023 05:00) (16 - 20)  SpO2: 98% (23 Sep 2023 05:00) (90% - 100%)    Parameters below as of 23 Sep 2023 05:00  Patient On (Oxygen Delivery Method): room air    PHYSICAL EXAM:  General: NAD; speaking in full sentences  HEENT: NC/AT; PERRL; EOMI; MMM  Neck: supple; no JVD  Cardiac: RRR; +S1/S2  Pulm: CTA B/L; no W/R/R  GI: soft, NT/ND, +BS  Extremities: WWP; no edema, clubbing or cyanosis  Vasc: 2+ radial, DP pulses B/L  Neuro: AAOx3; no focal deficits    MEDICATIONS:  MEDICATIONS  (STANDING):  amLODIPine   Tablet 5 milliGRAM(s) Oral every 24 hours  enoxaparin Injectable 30 milliGRAM(s) SubCutaneous every 24 hours  metoprolol succinate  milliGRAM(s) Oral every 24 hours  pantoprazole    Tablet 40 milliGRAM(s) Oral before breakfast  triamterene 37.5 mG/hydrochlorothiazide 25 mG Tablet 1 Tablet(s) Oral daily    MEDICATIONS  (PRN):  acetaminophen     Tablet .. 1000 milliGRAM(s) Oral every 6 hours PRN Temp greater or equal to 38C (100.4F), Mild Pain (1 - 3)  hydrALAZINE Injectable 5 milliGRAM(s) IV Push every 6 hours PRN SBP > 180  LORazepam   Injectable 2 milliGRAM(s) IV Push every 1 hour PRN CIWA-Ar score 8 or greater      ALLERGIES:  Allergies    No Known Allergies    Intolerances        LABS:                        13.5   8.88  )-----------( 189      ( 22 Sep 2023 06:05 )             42.0     09-22    137  |  103  |  6<L>  ----------------------------<  91  3.6   |  25  |  0.67    Ca    9.2      22 Sep 2023 06:05  Phos  2.4     09-22  Mg     1.8     09-22    TPro  7.3  /  Alb  3.1<L>  /  TBili  0.4  /  DBili  x   /  AST  17  /  ALT  13  /  AlkPhos  98  09-22        RADIOLOGY & ADDITIONAL TESTS: Reviewed.

## 2023-09-23 NOTE — PROVIDER CONTACT NOTE (EICU) - RECOMMENDATIONS
pt needs patient needs to be placed on CO. pt needs to be re-directed constantly. frequent rounds were made.

## 2023-09-23 NOTE — PROGRESS NOTE ADULT - PROBLEM SELECTOR PLAN 2
Treated in ED w/ 2 runs of Nicardipine drip 25mL/hr, 1 10mg Labetalol IVP, goal of 20% reduction. BP has since normalized to SBP 130s-180s, nicardipine drip stopped, no EKG changes  plan  - Continue home triamterene 37.5mg/hydrochlorothiazide 25mg qday  - Continue with Metoprolol succinate 100 mg q day  - Amlodipine 5 mg started Treated in ED w/ 2 runs of Nicardipine drip 25mL/hr, 1 10mg Labetalol IVP, goal of 20% reduction. BP has since normalized to SBP 130s-180s, nicardipine drip stopped, no EKG changes  plan  - Continue home triamterene 37.5mg/hydrochlorothiazide 25mg qday  - Continue with Metoprolol succinate 100 mg q day  - Amlodipine 5 mg changed to Amlodipine 10 mg q day

## 2023-09-23 NOTE — PROGRESS NOTE ADULT - PROVIDER SPECIALTY LIST ADULT
Infectious Disease
Infectious Disease
MICU
Neurology
Infectious Disease
MICU
Internal Medicine

## 2023-09-23 NOTE — PROVIDER CONTACT NOTE (EICU) - BACKGROUND
pt admitted for AMS, HTN pt admitted for AMS, HTN management patient admitted for AMS, HTN management

## 2023-09-23 NOTE — PROGRESS NOTE ADULT - PROBLEM SELECTOR PLAN 3
Patient reports almost daily drinking of Gisele. Reports history of passing out from alcohol use. Denies history of hospitalization for alcohol withdrawal.   plan  - CIWA protocol

## 2023-09-23 NOTE — PROVIDER CONTACT NOTE (EICU) - SITUATION
pt found on the floor by another RN. pt found on the floor by family member from [facility]. per family member pt hit the R side of head on closet but didn't touch the floor. bed alarm on but not working. [id number] in place,. per PCA report she was  getting report on [facility] pt. pt found on the floor by family member (son) from [place] stating pt trying to reach out the closet and hit the side of head on closet and sat on the ground but head didn't touch the floor as pt were trying to reach out the closet. family member helped her get up and sit her on the bed.  bed alarm on but not working. [id number] in place prior shift. pt needs constantly to be re-directed. pt intructed to utilize callbell pt found on the floor by family member (son) from [facility] stating pt trying to reach out the closet and hit the side of head on closet and sat on the ground but head didn't touch the floor as pt were trying to reach out the closet. family member helped her get up and sit her on the bed.  bed alarm on but not working. [id number] in place prior shift. pt needs constantly to be re-directed. pt instructed to utilize call, pt verbalized an understanding pt found on the floor by family member (son) from [facility] stating pt trying to reach out the patient's closet and hit the side of head on closet and sat on the ground but head didn't touch the floor as patient were trying to reach out the closet. family member (son) helped her get up and sit her on the bed.  bed alarm on but not working. [id number] in place prior shift. pt needs constantly to be re-directed. patient instructed to utilize call bell and patient verbalized an understanding. frequent rounding made. safety measures placed

## 2023-09-23 NOTE — PROGRESS NOTE ADULT - PROBLEM SELECTOR PLAN 4
New LUE edema 9/20 am. Improved 9/21 with strong pulse   - SHRADDHA

## 2023-09-23 NOTE — CHART NOTE - NSCHARTNOTEFT_GEN_A_CORE
Medicine    Post Fall Assessment    CHIEF COMPLAINT   Patient is a 62y old  Female who presents with a chief complaint of Hypertensive emergency (23 Sep 2023 06:32)      EVENT SUMMARY   Notified by Rn for patient s/p  fall. Pt seen and examined at bedside. Pt states did hit head. Pt has no complaints at this time. Denies SOB, CP, palpitations, dizziness, LOC, hip / back pain.     MEDICATIONS  (STANDING):  amLODIPine   Tablet 5 milliGRAM(s) Oral every 24 hours  enoxaparin Injectable 30 milliGRAM(s) SubCutaneous every 24 hours  metoprolol succinate  milliGRAM(s) Oral every 24 hours  pantoprazole    Tablet 40 milliGRAM(s) Oral before breakfast  triamterene 37.5 mG/hydrochlorothiazide 25 mG Tablet 1 Tablet(s) Oral daily    MEDICATIONS  (PRN):  acetaminophen     Tablet .. 1000 milliGRAM(s) Oral every 6 hours PRN Temp greater or equal to 38C (100.4F), Mild Pain (1 - 3)  hydrALAZINE Injectable 5 milliGRAM(s) IV Push every 6 hours PRN SBP > 180  LORazepam   Injectable 2 milliGRAM(s) IV Push every 1 hour PRN CIWA-Ar score 8 or greater      Vital Signs Last 24 Hrs  T(C): 37.1 (23 Sep 2023 06:00), Max: 37.4 (22 Sep 2023 13:36)  T(F): 98.7 (23 Sep 2023 06:00), Max: 99.3 (22 Sep 2023 13:36)  HR: 70 (23 Sep 2023 08:08) (58 - 88)  BP: 193/93 (23 Sep 2023 08:08) (149/93 - 197/84)  BP(mean): 133 (23 Sep 2023 08:08) (110 - 145)  RR: 20 (23 Sep 2023 08:08) (16 - 20)  SpO2: 98% (23 Sep 2023 05:00) (90% - 100%)    Parameters below as of 23 Sep 2023 08:08  Patient On (Oxygen Delivery Method): room air        Physical Exam  General: NAD, resting comfortably in   Mental Status: A&O x3  Skin: Warm, dry, no rashes, lesions, ecchymosis, bruises   Head: NCAT  Neuro: Non focal  Cardiac: S1/S2. No murmus appreciated  Lungs: CTA b/l. No rales, wheezes, rhonchi appreciated b/l.   Abdomen: Soft, +BS, non distended  Extremities: No edema. Full ROM all 4 extremities.     Pt ambulating appropriately with no vertebral / hip tenderness.     A&P  Patient is a 62y old  Female who presents with a chief complaint of Hypertensive emergency    1) s/p fall  - CT head ordered to r/o acute bleed.   - EKG  - Fingerstick  - CBC/BMP  - Bed alarm  - fall precautions   - case discussed with   - Will discuss case with primary team in AM Medicine    Post Fall Assessment    CHIEF COMPLAINT   Patient is a 62y old  Female who presents with a chief complaint of Hypertensive emergency (23 Sep 2023 06:32)      EVENT SUMMARY   Notified by Rn for patient s/p  fall. Pt seen and examined at bedside. Pt states did hit head. Pt has no complaints at this time. Denies SOB, CP, palpitations, dizziness, LOC, hip / back pain.     MEDICATIONS  (STANDING):  amLODIPine   Tablet 5 milliGRAM(s) Oral every 24 hours  enoxaparin Injectable 30 milliGRAM(s) SubCutaneous every 24 hours  metoprolol succinate  milliGRAM(s) Oral every 24 hours  pantoprazole    Tablet 40 milliGRAM(s) Oral before breakfast  triamterene 37.5 mG/hydrochlorothiazide 25 mG Tablet 1 Tablet(s) Oral daily    MEDICATIONS  (PRN):  acetaminophen     Tablet .. 1000 milliGRAM(s) Oral every 6 hours PRN Temp greater or equal to 38C (100.4F), Mild Pain (1 - 3)  hydrALAZINE Injectable 5 milliGRAM(s) IV Push every 6 hours PRN SBP > 180  LORazepam   Injectable 2 milliGRAM(s) IV Push every 1 hour PRN CIWA-Ar score 8 or greater      Vital Signs Last 24 Hrs  T(C): 37.1 (23 Sep 2023 06:00), Max: 37.4 (22 Sep 2023 13:36)  T(F): 98.7 (23 Sep 2023 06:00), Max: 99.3 (22 Sep 2023 13:36)  HR: 70 (23 Sep 2023 08:08) (58 - 88)  BP: 193/93 (23 Sep 2023 08:08) (149/93 - 197/84)  BP(mean): 133 (23 Sep 2023 08:08) (110 - 145)  RR: 20 (23 Sep 2023 08:08) (16 - 20)  SpO2: 98% (23 Sep 2023 05:00) (90% - 100%)    Parameters below as of 23 Sep 2023 08:08  Patient On (Oxygen Delivery Method): room air        Physical Exam  General: NAD, resting comfortably in   Mental Status: A&O x3  Skin: Warm, dry, no rashes, lesions, ecchymosis, bruises   Head: NCAT  Neuro: Non focal  Cardiac: S1/S2. No murmus appreciated  Lungs: CTA b/l. No rales, wheezes, rhonchi appreciated b/l.   Abdomen: Soft, +BS, non distended  Extremities: No edema. Full ROM all 4 extremities.     Pt ambulating appropriately with no vertebral / hip tenderness.     A&P  Patient is a 62y old  Female who presents with a chief complaint of Hypertensive emergency    1) s/p fall  - CT head ordered to r/o acute bleed.   - EKG  - Fingerstick  - CBC/BMP  - Bed alarm  - fall precautions Medicine    Post Fall Assessment    CHIEF COMPLAINT   Patient is a 62y old  Female who presents with a chief complaint of Hypertensive emergency (23 Sep 2023 06:32)      EVENT SUMMARY   Notified by Rn for patient s/p fall, patient stated that she tried to get up and was pulling the wire, however she pulled too hard and fell on her back side and then hit her head on the wall. Pt seen and examined at bedside. Pt states did hit head. Pt has no complaints at this time. Denies SOB, CP, palpitations, dizziness, LOC, hip / back pain.     MEDICATIONS  (STANDING):  amLODIPine   Tablet 5 milliGRAM(s) Oral every 24 hours  enoxaparin Injectable 30 milliGRAM(s) SubCutaneous every 24 hours  metoprolol succinate  milliGRAM(s) Oral every 24 hours  pantoprazole    Tablet 40 milliGRAM(s) Oral before breakfast  triamterene 37.5 mG/hydrochlorothiazide 25 mG Tablet 1 Tablet(s) Oral daily    MEDICATIONS  (PRN):  acetaminophen     Tablet .. 1000 milliGRAM(s) Oral every 6 hours PRN Temp greater or equal to 38C (100.4F), Mild Pain (1 - 3)  hydrALAZINE Injectable 5 milliGRAM(s) IV Push every 6 hours PRN SBP > 180  LORazepam   Injectable 2 milliGRAM(s) IV Push every 1 hour PRN CIWA-Ar score 8 or greater      Vital Signs Last 24 Hrs  T(C): 37.1 (23 Sep 2023 06:00), Max: 37.4 (22 Sep 2023 13:36)  T(F): 98.7 (23 Sep 2023 06:00), Max: 99.3 (22 Sep 2023 13:36)  HR: 70 (23 Sep 2023 08:08) (58 - 88)  BP: 193/93 (23 Sep 2023 08:08) (149/93 - 197/84)  BP(mean): 133 (23 Sep 2023 08:08) (110 - 145)  RR: 20 (23 Sep 2023 08:08) (16 - 20)  SpO2: 98% (23 Sep 2023 05:00) (90% - 100%)    Parameters below as of 23 Sep 2023 08:08  Patient On (Oxygen Delivery Method): room air        Physical Exam  General: NAD, resting comfortably in   Mental Status: A&O x3  Skin: Warm, dry, no rashes, lesions, ecchymosis, bruises   Head: NCAT  Neuro: Non focal  Cardiac: S1/S2. No murmus appreciated  Lungs: CTA b/l. No rales, wheezes, rhonchi appreciated b/l.   Abdomen: Soft, +BS, non distended  Extremities: No edema. Full ROM all 4 extremities.     Pt ambulating appropriately with no vertebral / hip tenderness.     A&P  Patient is a 62y old  Female who presents with a chief complaint of Hypertensive emergency    1) s/p fall  - CT head ordered to r/o acute bleed.   - EKG  - Fingerstick  - CBC/BMP wnl checked this morning  - Bed alarm  - fall precautions

## 2023-09-23 NOTE — PROVIDER CONTACT NOTE (EICU) - ASSESSMENT
MD Russ and MD Campbell at immediately assessed at bedside. VSS. Pt denying any pain or discomfort. Pt A&Ox4. no s/sx of acute distress noted. pt assisted back to bed MD Russ and MD Campbell made aware and assessing pt at bedside. VSS. Pt denying any pain or discomfort. Pt A&Ox4, forgetful . no s/sx of acute distress noted. MD Russ and MD Campbell made aware and assessing patient at bedside. VSS. Patient denying any pain or discomfort. Pt A&Ox4, forgetful. no s/sx of acute distress noted.

## 2023-09-24 VITALS — TEMPERATURE: 98 F

## 2023-09-24 LAB
CULTURE RESULTS: SIGNIFICANT CHANGE UP
SPECIMEN SOURCE: SIGNIFICANT CHANGE UP

## 2023-09-25 LAB
% GAMMA, URINE: 10.6 % — SIGNIFICANT CHANGE UP
ALBUMIN 24H MFR UR ELPH: 32.1 % — SIGNIFICANT CHANGE UP
ALPHA1 GLOB 24H MFR UR ELPH: 27.2 % — SIGNIFICANT CHANGE UP
ALPHA2 GLOB 24H MFR UR ELPH: 17.1 % — SIGNIFICANT CHANGE UP
B BURGDOR DNA SPEC QL NAA+PROBE: NEGATIVE — SIGNIFICANT CHANGE UP
B BURGDOR DNA SPEC QL NAA+PROBE: NEGATIVE — SIGNIFICANT CHANGE UP
B-GLOBULIN 24H MFR UR ELPH: 13 % — SIGNIFICANT CHANGE UP
COLLECT DURATION TIME UR: 24 HR — SIGNIFICANT CHANGE UP
INTERPRETATION 24H UR IFE-IMP: SIGNIFICANT CHANGE UP
M PROTEIN 24H UR ELPH-MRATE: 0 % — SIGNIFICANT CHANGE UP
M PROTEIN 24H UR ELPH-MRATE: 0 MG/24HR — SIGNIFICANT CHANGE UP (ref 0–0)
M PROTEIN 24H UR ELPH-MRATE: 0 MG/DL — SIGNIFICANT CHANGE UP
PROT PATTERN 24H UR ELPH-IMP: SIGNIFICANT CHANGE UP
PROTEIN QUANT CALC, URINE: 216 MG/24 H — HIGH (ref 50–100)
TOTAL VOLUME - 24 HOUR: 2400 ML — SIGNIFICANT CHANGE UP
URINE CREATININE CALCULATION: 0.6 G/24 H — LOW (ref 0.8–1.8)

## 2023-09-26 PROBLEM — Z00.00 ENCOUNTER FOR PREVENTIVE HEALTH EXAMINATION: Status: ACTIVE | Noted: 2023-09-26

## 2023-09-28 LAB — MISCELLANEOUS TEST NAME: SIGNIFICANT CHANGE UP

## 2023-09-29 DIAGNOSIS — H53.141 VISUAL DISCOMFORT, RIGHT EYE: ICD-10-CM

## 2023-09-29 DIAGNOSIS — F10.10 ALCOHOL ABUSE, UNCOMPLICATED: ICD-10-CM

## 2023-09-29 DIAGNOSIS — I16.1 HYPERTENSIVE EMERGENCY: ICD-10-CM

## 2023-09-29 DIAGNOSIS — Z53.29 PROCEDURE AND TREATMENT NOT CARRIED OUT BECAUSE OF PATIENT'S DECISION FOR OTHER REASONS: ICD-10-CM

## 2023-09-29 DIAGNOSIS — I67.83 POSTERIOR REVERSIBLE ENCEPHALOPATHY SYNDROME: ICD-10-CM

## 2023-09-29 DIAGNOSIS — I67.4 HYPERTENSIVE ENCEPHALOPATHY: ICD-10-CM

## 2023-09-29 DIAGNOSIS — Z78.1 PHYSICAL RESTRAINT STATUS: ICD-10-CM

## 2023-09-29 LAB — MISCELLANEOUS TEST NAME: SIGNIFICANT CHANGE UP

## 2023-10-02 LAB
CULTURE RESULTS: NO GROWTH — SIGNIFICANT CHANGE UP
SPECIMEN SOURCE: SIGNIFICANT CHANGE UP

## 2023-10-18 LAB
CULTURE RESULTS: SIGNIFICANT CHANGE UP
CULTURE RESULTS: SIGNIFICANT CHANGE UP
SPECIMEN SOURCE: SIGNIFICANT CHANGE UP
SPECIMEN SOURCE: SIGNIFICANT CHANGE UP

## 2024-03-12 NOTE — PROCEDURAL SAFETY CHECKLIST WITH OR WITHOUT SEDATION - NSPREPROCSEDMD_GEN_ALL_CORE
Left message for patient requesting a return call to schedule a consult with Sweetie for chest pain per referral from Courtney Nevarez.    Registered Nurse/unable

## 2024-12-29 NOTE — PROGRESS NOTE ADULT - ASSESSMENT
62F presents w/ AMS, elevated /106 w/ HA, and blurry vision likely 2/2 to HTN emergency improved on Nicardipine drip but continues to be altered. Patient was intubated and sedated 9/18 for LP. LP results unremarkable to date. MRI 9/20 indicative of atypical PRES vs. toxic metabolic causes of AMS.  show

## 2025-05-26 NOTE — ED PROVIDER NOTE - IV ALTEPLASE INCLUSION HIDDEN
[facility] INPATIENT ENCOUNTER - PULMONARY PROGRESS NOTE    Patient: Lauren Rodriguez Date: 25   Admission Date: 2025 LOS: 15   : 1951 Attending: Kamrno Weeks MD   Code Status: Full Resuscitation Consulting Provider:  Juan Paige MD       Interval History     : Patient is seen and examined.  She is sitting in bed eating breakfast.  She is oxygenating on 1 L of oxygen nasal cannula pulse ox 99%.  She reports that she feels better.  No documented fevers in 24 hours.  No other acute overnight events per nursing staff.  Has changed her mind and is willing to consider bronchoscopy.    Total I/O's negative > 16.4 L.     : Patient is seen and examined.  She is lying in bed.  Oxygenating on 2 L of oxygen nasal cannula pulse ox 96%.  Per nursing staff she does desaturate with ambulation.  Patient does corroborate feeling more short of breath with movement.  No documented fevers in 24 hours.  No other acute overnight events per nursing staff.  Has declined bronchoscopy previously.    Total I/O's negative > 14.5 L.     : Patient seen and examined.  Sitting up in bed.  Oxygenating on NC 4 L.  Pulse ox 94%.  Worked with therapy today - stand to pivot transfers.  Tolerated activity without significant desaturation noted.  Appears disinterested in bronchoscopy; would recommend reconsideration if respiratory status fails to improve.     :  Patient seen and examined.  Sitting up in bed.  Baseline O2 requirements RA.  Current O2 requirements NC 4 L.  Pulse ox 95%.  Denies rest symptoms of dyspnea.  No conversational dyspnea noted.  Refused to work with therapy, \"I am tired.\"  Encouraged continued activity progression.    : Patient seen and examined.  Sitting up in bed - resting comfortably.  Eyes open to voice.  Baseline O2 requirements RA.  Current O2 requirements [place] 6 L.  Pulse ox 97%.  Denies rest symptoms of dyspnea but still requiring significant amounts of oxygen.  ID consulted.  Offered bronchoscopy - reviewed risks / benefits.  Wants to think about it.     5/21: Patient seen and examined.  Sitting up in bed.  Case discussed with bedside RN and Dr. Weeks.  Increasing O2 requirements; required 10 L to achieve 90%. Currently HFNC 6 L.  Pulse ox 96%.  Imaging ordered - CTA PE protocol.      5/20:  Patient seen and examined.  Sitting up in bed.  Case discussed with bedside RN.  Oxygenating on HFNC 6 L.  Pulse ox 95%  \"I am breathing ok.\"  Therapy following. Accepted to the [facility]; awaiting insurance authorization.      5/19:  Patient seen and examined.  Sitting up in bed on her phone.  Case discussed with Dr. Weeks.  Baseline O2 requirements RA.  Current O2 requirements NC 5 L . Denies rest symptoms of dyspnea.  No conversational dyspnea noted.  Only complaint is the unsteady walker.  \"It wiggles too much.\"  Agreeable to rehab post discharge.     5/18: Patient seen and examined.  Case discussed with bedside RN Clarisse.  Sitting up in bed; resting comfortably.  Eyes open to voice; drifted back to sleep. Baseline O2 requirements RA.  Current O2 requirements NC 3 L.  Pulse ox 95%.  Denies rest symptoms of dyspnea.  No conversational dyspnea noted. Home oxygen evaluation completed; 6 liters with minimal activity. Will give one dose of lasix.  Repeat chest x-ray in am.     5/17/2025: Patient was seen and examined, and the case was discussed with the bedside RN. She is alert, at her neurological baseline, and currently saturating at 93% on 4 L via nasal cannula, with a respiratory rate of 16. She remains hemodynamically stable with a blood pressure of 169/69, MAP of 103, and heart rate of 91. The patient has undergone diuresis over the past three days and is now net negative 9 liters since admission. Notably, her BNP is trending down, now at 5556. Other laboratory values remain grossly stable and unremarkable. Given her persistent oxygen needs, a home oxygen evaluation will be necessary, and she  will likely require supplemental oxygen at discharge. Plan is to continue oxygen therapy, supportive care, aggressive participation in physical and occupational therapy, and to promote adequate nutrition to support recovery.    5/16/2025: Patient was seen and examined; case discussed with bedside RN and hospitalist. Patient saturating 98% on 2?L oxygen--staff instructed to titrate to maintain goal saturation of 88-92%. Recent ABG: pH 7.44, pCO? 49, pO? 57, HCO? 33. Hemodynamically stable; electrolytes relatively unremarkable. BNP is downtrending from 16,474 to 9750. Will continue current plan with aggressive pulmonary hygiene, further diuresis, and supportive care.      5/15/2025: Patient was seen and examined, and the case was discussed with the bedside RN. The patient is currently saturating 95% on 3 liters of oxygen via nasal cannula; however, bedside staff noted intermittent desaturations overnight, likely secondary to undiagnosed obstructive sleep apnea. A CT scan of the chest performed yesterday showed mild to moderate multifocal airspace opacities with an associated crazy paving pattern. The differential diagnosis is broad and includes infectious, inflammatory etiologies, and pulmonary edema. The imaging appearance is somewhat atypical for pulmonary edema, although the patient has an elevated proBNP. The patient received a dose of IV furosemide resulting in diuresis of 4700 mL. Cardiology was consulted given the elevated troponin level, which is possibly related to a septic state or demand ischemia; the patient denies cardiac symptoms and remains hemodynamically stable. Current cardiac medications include aspirin, Brilinta, atorvastatin, and metoprolol XL. Transthoracic echocardiogram revealed normal left ventricular size and systolic function with an ejection fraction of 63% and no regional wall motion abnormalities. A pharmacologic stress test is planned to further evaluate for ischemia.    HPI: The patient  is a 73-year-old woman with a complex medical history significant for coronary artery disease, hypertension, hyperlipidemia, chronic obstructive pulmonary disease (COPD), anemia, gastroesophageal reflux disease (GERD), recurrent pneumonia and urinary tract infections, arthritis, anxiety, depression, and bipolar I disorder. She presented to the [facility] emergency department on the afternoon of May 11, 2025, following a fall. She reported attempting to transfer from her bed to her wheelchair to take a bath when she fell, landing on both knees and experiencing bilateral knee pain. Notably, she is typically non-ambulatory and relies on a wheelchair for mobility.    While in the emergency department for evaluation of knee pain, she was noted to be febrile and hypotensive. She subsequently endorsed a new cough over the past few days but denied shortness of breath, dysuria, gastrointestinal symptoms, or other complaints. Her vital signs on arrival included a temperature of 101.1°F, heart rate of 91 bpm, respiratory rate of 20, blood pressure of 82/52 mmHg, and oxygen saturation of 94% on 3 liters of supplemental oxygen via nasal cannula. Laboratory studies were notable for hyponatremia, hyperglycemia, elevated creatinine with a GFR of 28, hypoalbuminemia, elevated NT-proBNP and troponin I, leukocytosis, and mild anemia. Lactic acid and procalcitonin were only mildly elevated. Infectious testing including COVID-19, RSV, influenza A/B, and MRSA PCR were all negative; blood cultures were obtained and pending. Chest X-ray revealed increased interstitial markings possibly indicative of fluid overload or mild congestive heart failure, along with a left lower lobe airspace opacity suggestive of pneumonia.    She was admitted with a working diagnosis of sepsis (responsive to fluids), community-acquired pneumonia, acute hypoxic respiratory failure, and acute kidney injury. Initially placed on the medical floor,  she was transferred to the ICU later that evening due to recurrent hypotension and worsening respiratory status. Arterial blood gas at the time demonstrated hypercapnic respiratory acidosis with a pH of 7.25, pCO2 of 53, and bicarbonate of 23, with a PO2 of 98 and O2 saturation of 98% on BiPAP (15/7, 30% FiO2, backup rate of 20). She appeared somnolent, minimally responsive to verbal and tactile stimuli, and was not following commands.    Over the next 48 hours, her condition stabilized. She did not require vasopressor support, and BiPAP was discontinued as her respiratory status improved. She transitioned to 1 liter of oxygen via nasal cannula and was transferred back to the medical floor on May 13, 2025, for ongoing care and monitoring. As of May 14, 2025, she remains stable on low-flow oxygen, and her hypotension has resolved with fluid resuscitation alone.      Active Problems:     Acute hypercapnic and hypoxic respiratory failure  Community-acquired pneumonia  Emphysema with COPD by history  Former smoker  History of reactive airway disease  GERD      Assessment / PLAN:     # Acute hypoxic and hypercapnic respiratory failure  # [facility] pneumonia  # Emphysema with COPD  # Former smoker  -- O2 requirements  -- Baseline; RA  -- Peak; BiPAP; 18/7; 24%  -- Current; NC 1 L   -- Recent ABG   -- pH 7.44; pCO2 49; pO2 57; HCO3 33   -- SpO2 93%   -- Goal 88%-92%  -- CXR; 5/11/2025  -- Left lower lobe airspace disease  -- Increased interstitial lung markings, probably due to fluid overload or mild CHF.   -- CToC; 5/14/2025   --  Mild to moderate multifocal airspace opacities with associated crazy paving pattern  -- Wide differential including infectious/inflammatory etiologies as well as pulmonary edema.   -- Appearance is somewhat atypical for pulmonary edema, the patient has an elevated proBNP  -- Prompt resolution would favor edema  -- Pulmonary alveolar proteinosis is unlikely in the acute setting  -- Mildly  enlarged mediastinal lymph nodes, which are indeterminate  -- Small pleural effusions  -- CTA Chest; 2023   -- Documented mid paraseptal emphysema  -- PFTs: No spirometry on file for review  -- Etiology    -- Community-acquired pneumonia vs CHF exacerbation vs severe sepsis with septic shock    -- BNP [id number]  -- IV Lasix; as needed   -- I/O; net negative 14.5 L  -- Infectious workup  -- Afebrile  -- WBC 16.2  -- LA 1.5  -- Procalcitonin 0.06  -- Blood cultures; NGTD  -- Sputum w/ gram stain; NGTD  -- Respiratory pathogen panel; negative   -- Urine antigens; negative   -- MRSA PCR; negative   -- COVID/Influenza/RSV; negative   -- Prednisone taper  -- ABx             -- on Abx Cefepime and Doxycycline    -- PTA Medications   -- Albuterol rescue inhaler   -- Albuterol nebulization  -- Scheduled and as needed inhaled bronchodilators  -- Supplemental oxygen to maintain saturations > 88%  -- Pulmonary hygiene: C/DB/IS, mobilize   -- Continued smoking cessation  -- follow up on pending CT chest              -- multifocal infiltrates   -- offered bronchoscopy -> previously declined      # History of reactive airway disease  -- Does not appear to be in acute exacerbation      # GERD  -- PTA Protonix         ALLERGIES:   Allergen Reactions    Blue Dyes   (Food Or Med) RASH    Ibuprofen RASH    Latex HIVES    Tape [Adhesive   (Environmental)] RASH        PERTINENT DIAGNOSTICS/PROCEDURES:  CTA CHEST PULMONARY EMBOLISM   Final Result   IMPRESSION:         1. No pulmonary embolus.      2. Redemonstration of multifocal airspace opacities diffusely throughout   the lungs on a background of chronic interstitial change. There has been   interval resolution of the small bilateral pleural effusions seen on the   previous study.         Electronically Signed by: Riki Lopez MD   Signed on: 5/21/2025 2:05 PM   Created on Workstation ID: [id number]   Signed on Workstation ID: [id number]      XR  CHEST AP OR PA   Final Result   IMPRESSION:   Slightly worsened appearance mild to moderate mixed interstitial and   airspace disease.      Electronically Signed by: ALTA MARTINEZ MD   Signed on: 2025 9:19 AM   Created on Workstation ID: [id number]   Signed on Workstation ID: [id number]      CT CHEST WO CONTRAST   Final Result   IMPRESSION:   1.  Mild to moderate multifocal airspace opacities with associated crazy   paving pattern. Wide differential including infectious/inflammatory   etiologies as well as pulmonary edema. Appearance is somewhat atypical for   pulmonary edema, the patient has an elevated proBNP. Prompt resolution   would favor edema. Pulmonary alveolar proteinosis is unlikely in the acute   setting.    2.  Mildly enlarged mediastinal lymph nodes, which are indeterminate.   Recommend 3-month follow-up CT.   3.  Small pleural effusions.       Electronically Signed by: Wes Akhtar MD   Signed on: 2025 12:41 PM   Created on Workstation ID: [id number]   Signed on Workstation ID: [id number]      US KIDNEY BILATERAL   Final Result   IMPRESSION:   1. No evidence of bilateral hydronephrosis or nephrolithiasis.   2.  3.1 cm hyperechoic focus along the superior aspect of the right kidney.   Questionable 3.6 cm masslike process in the lateral aspect of the left   kidney. CT or MRI recommended for further evaluation.            Electronically Signed by: Bulmaro Costa MD   Signed on: 2025 11:51 AM   Created on Workstation ID: [id number]   Signed on Workstation ID: [id number]      CT HEAD WO CONTRAST   Final Result   IMPRESSION:   No CT evidence of acute intracranial process.               Electronically Signed by: Bulmaro Costa MD   Signed on: 2025 10:49 PM   Created on Workstation ID: [id number]   Signed on Workstation ID: [id number]      XR KNEE 3 VIEWS BILATERAL   Final Result   IMPRESSION:      Moderate degenerative change of both knees.      //Location Code: [facility] [place]         Electronically Signed  by: Zenia Vang MD   Signed on: 5/11/2025 2:46 PM   Created on Workstation ID: [id number]   Signed on Workstation ID: [id number]      XR CHEST AP OR PA   Final Result   IMPRESSION:       1.  Left lower lobe airspace disease. Please correlate clinically to   exclude pneumonia.      2.  Increased interstitial lung markings, probably due to fluid overload or   mild CHF.      Electronically Signed by: Zenia Vang MD   Signed on: 5/11/2025 2:11 PM   Created on Workstation ID: [id number]   Signed on Workstation ID: [id number]          SCHEDULED MEDS:  Current Facility-Administered Medications   Medication Dose Route Frequency Provider Last Rate Last Admin    doxycycline hyclate (VIBRAMYCIN) capsule 100 mg  100 mg Oral 2 times per day Kamron Weeks MD   100 mg at 05/26/25 0951    cefepime (MAXIPIME) 1,000 mg in sodium chloride 0.9 % 100 mL IVPB  1,000 mg Intravenous 3 times per day Kamron Weeks  mL/hr at 05/26/25 0546 1,000 mg at 05/26/25 0546    furosemide (LASIX) tablet 40 mg  40 mg Oral Daily Kamron Weeks MD   40 mg at 05/26/25 0950    Potassium Standard Replacement Protocol (Levels 3.5 and lower)   Does not apply See Admin Instructions Kamron Weeks MD        Magnesium Standard Replacement Protocol   Does not apply See Admin Instructions Kamron Weeks MD        enoxaparin (LOVENOX) injection 40 mg  40 mg Subcutaneous Q24H Kamron Weeks MD   40 mg at 05/26/25 0954    predniSONE (DELTASONE) tablet 10 mg  10 mg Oral Daily with breakfast Carmelo Solis NP   10 mg at 05/26/25 0950    ipratropium-albuterol (DUONEB) 0.5-2.5 (3) MG/3ML nebulizer solution 3 mL  3 mL Nebulization 4x Daily Resp Carmelo Solis NP   3 mL at 05/26/25 0644    OXcarbazepine (TRILEPTAL) tablet 300 mg  300 mg Oral Q Evening Aleksandr Roy MD   300 mg at 05/25/25 1816    sertraline (ZOLOFT) tablet 50 mg  50 mg Oral Q Evening Kirill, Aleksandr G, MD   50 mg at 05/25/25 1817    insulin regular (human) (HumuLIN R, NovoLIN R) correction  Dose   Subcutaneous 4x Daily AC & HS Aleksandr Roy MD   1 Units at 05/24/25 1727    cariprazine (VRAYLAR) capsule 3 mg  3 mg Oral Q Evening Aleksandr Roy MD   3 mg at 05/25/25 1816    aspirin chewable 81 mg  81 mg Oral Daily Zaki uDmont MD   81 mg at 05/26/25 0950    atorvastatin (LIPITOR) tablet 40 mg  40 mg Oral Nightly Zaki Dumont MD   40 mg at 05/25/25 2111    metoPROLOL succinate (TOPROL-XL) ER tablet 25 mg  25 mg Oral Daily Zaki Dumont MD   25 mg at 05/26/25 0951    ticagrelor (BRILINTA) tablet 90 mg  90 mg Oral BID Pamela Logan APNP   90 mg at 05/26/25 0951    pantoprazole (PROTONIX) EC tablet 40 mg  40 mg Oral BID Aleksandr Roy MD   40 mg at 05/26/25 0950    sodium chloride 0.9 % injection 2 mL  2 mL Intracatheter 2 times per day Aleksandr Roy MD   2 mL at 05/26/25 0951    guaiFENesin (MUCINEX) ER tablet 600 mg  600 mg Oral 2 times per day Aleksandr Roy MD   600 mg at 05/26/25 0951    nystatin (MYCOSTATIN) powder   Topical TID Aleksandr Roy MD   Given at 05/25/25 2116     CONTINUOUS INFUSIONS:   Current Facility-Administered Medications   Medication Dose Route Frequency Provider Last Rate Last Admin     PRN MEDS:  Current Facility-Administered Medications   Medication Dose Route Frequency Provider Last Rate Last Admin    ALPRAZolam (XANAX) tablet 0.5 mg  0.5 mg Oral Q8H PRN Patric Lyons MD   0.5 mg at 05/25/25 2111    ondansetron (ZOFRAN) injection 4 mg  4 mg Intravenous Q8H PRN Kamron Weeks MD        sodium chloride 0.9 % injection 10 mL  10 mL Intravenous PRN Aleksandr Roy MD        acetaminophen (TYLENOL) tablet 650 mg  650 mg Oral Q4H PRN Aleksandr Roy MD        Or    acetaminophen (TYLENOL) suppository 650 mg  650 mg Rectal Q4H PRN Aleksandr Roy MD        traMADol (ULTRAM) tablet 50 mg  50 mg Oral Q6H PRAleksandr Limon MD   50 mg at 05/25/25 2111    polyethylene glycol (MIRALAX) packet 17 g  17 g Oral Daily Aleksandr Alvarado MD   17 g at 05/26/25  show 0843    ipratropium-albuterol (DUONEB) 0.5-2.5 (3) MG/3ML nebulizer solution 3 mL  3 mL Nebulization Q4H Resp PRN Aleksandr Roy MD   3 mL at 05/14/25 1102    hydrALAZINE (APRESOLINE) injection 10 mg  10 mg Intravenous Q6H PRN Aleksandr Roy MD   10 mg at 05/16/25 0401    benzonatate (TESSALON PERLES) capsule 100 mg  100 mg Oral TID PRN Aleksandr Roy MD   100 mg at 05/20/25 0851    dextrose 50 % injection 25 g  25 g Intravenous PRN Nizeyaduhr Kimani W, DO        dextrose 50 % injection 12.5 g  12.5 g Intravenous PRN Niebuhr, Kimani W, DO        glucagon (GLUCAGEN) injection 1 mg  1 mg Intramuscular PRN Niebuhr, Kimani W, DO        dextrose (GLUTOSE) 40 % gel 15 g  15 g Oral PRN Niebuhr, Kimani W, DO        dextrose (GLUTOSE) 40 % gel 30 g  30 g Oral PRN Niebuhr, Kimani W, DO           OBJECTIVE:  Vital Last Value 24 Hour Range   Temperature 97.5 °F (36.4 °C) (05/26/25 0700) Temp  Min: 97.5 °F (36.4 °C)  Max: 98.1 °F (36.7 °C)   Pulse 90 (05/26/25 0950) Pulse  Min: 71  Max: 90   Respiratory 18 (05/26/25 0504) Resp  Min: 16  Max: 18   Non-Invasive  Blood Pressure 123/81 (05/26/25 0950) BP  Min: 106/68  Max: 132/73   Pulse Oximetry 99 % (05/26/25 0700) SpO2  Min: 84 %  Max: 99 %     Vital Today Admitted   Weight 92.6 kg (204 lb 2.3 oz) (05/26/25 0504) Weight: 99 kg (218 lb 4.1 oz) (05/11/25 1305)   Height N/A Height: 5' 7\" (170.2 cm) (05/11/25 1305)   BMI N/A BMI (Calculated): 34.18 (05/11/25 1305)       LABORATORY DATA:  Recent Labs   Lab 05/26/25  0520 05/25/25  0442 05/24/25  0436 05/23/25  0430 05/22/25  0456   WBC 16.2* 14.5* 13.5* 18.8* 20.3*   RBC 4.64 4.42 4.10 4.14 4.19   HGB 14.0 13.3 12.3 12.3 12.7   HCT 42.7 40.9 38.1 38.9 39.2   MCV 92.0 92.5 92.9 94.0 93.6    258 242 277 246   Absolute Neutrophils 12.3* 10.2* 9.8* 15.0* 16.5*   Absolute Lymphocytes 2.3 2.6 1.8 1.7 1.7   Absolute Monocytes 1.3* 1.5* 1.6* 1.8* 1.5*   Absolute Eosinophils  0.0 0.1 0.1 0.1 0.2   Absolute Basophils 0.0 0.0 0.0 0.0 0.1      Recent Labs   Lab 05/26/25  0520 05/25/25  0442 05/24/25  1228 05/24/25  0436 05/23/25  1351 05/23/25  0430 05/22/25  1232 05/22/25  0456 05/19/25  0502 05/18/25  0451 05/17/25  1128 05/17/25  0449 05/16/25  1310 05/16/25  0448 05/15/25  0415 05/14/25  0450   Glucose 109* 110*  --  105*  --  99  --  122*   < > 103*  --  119*  --  147* 94 122*   Sodium 135 136  --  138  --  138  --  136   < > 138  --  141  --  138 137 133*   Potassium 3.7 3.7 3.7 3.5 4.1 3.5   < > 3.5   < > 4.0   < > 3.0*   < > 3.1* 3.6 4.1   Chloride 95* 96*  --  99  --  102  --  99   < > 97  --  95*  --  94* 99 101   BUN 31* 25*  --  19  --  15  --  17   < > 20  --  20  --  23* 25* 27*   Creatinine 0.64 0.65  --  0.65  --  0.62  --  0.62   < > 0.50*  --  0.54  --  0.61 0.73 0.73   Calcium 9.1 9.2  --  9.0  --  8.9  --  9.0   < > 9.2  --  9.3  --  9.2 9.3 9.1   Protein, Total  --   --   --   --   --   --   --   --   --  6.1*  --  6.1*  --  6.5 6.6 6.6   Albumin  --   --   --   --   --   --   --   --   --  3.0*  --  3.0*  --  3.3* 3.4 3.4   GOT/AST  --   --   --   --   --   --   --   --   --  37  --  44*  --  61* 79* 67*   Alkaline Phosphatase  --   --   --   --   --   --   --   --   --  78  --  79  --  86 90 86   GPT  --   --   --   --   --   --   --   --   --  38  --  41  --  52 53 41   Globulin  --   --   --   --   --   --   --   --   --  3.1  --  3.1  --  3.2 3.2 3.2    < > = values in this interval not displayed.     No results found    No results found    Invalid input(s): \"ALKP\"    Recent Labs   Lab 05/26/25 0520 05/25/25 0442 05/24/25 0436   HGB 14.0 13.3 12.3   HCT 42.7 40.9 38.1    258 242     Lab Results   Component Value Date    IRON 13 (L) 07/29/2023    TIBC 454 (H) 07/29/2023    PST 3 (L) 07/29/2023    MCV 92.0 05/26/2025    MCHC 32.8 05/26/2025    MCH 30.2 05/26/2025     Recent Labs   Lab 05/26/25 0520 05/25/25 0442 05/24/25 0436   WBC 16.2* 14.5* 13.5*     Lab Results   Component Value Date    CRP 32.1 (H) 05/21/2025        REVIEW OF SYSTEMS:  ROS: Denies excessive pain, nausea, vomiting, chest pain, shortness of breath, or dizziness.  Complains of chronic lower extremity pain. All other systems negative.       PHYSICAL EXAMINATION:  General: Morbidly Obese; In no respiratory distress, NC in place  Neuro: Alert and oriented and moving all extremities  HEENT: atraumatic normocephalic; trachea is midline  Respiratory: breath sounds decreased throughout lungs, no wheezes, no rales, no rhonchi  Cardiovascular: regular rate and rhythm  Abdomen: Soft, non tender, and bowel sound normal  Extremities: no edema and no cyanosis  Skin: no erythema noted over exposed areas, dry, and warm      Pertinent Reviewed: Allergies, Medications, Labs, Imaging, and Physician and Nursing Notes    Patient is stable from a respiratory stand point, pulmonology will sign off.    Can follow up with any pulmonary provider in 4 weeks post discharge.      On 5/26/2025, Saul LEIVA PA-C scribed the services personally performed by Juan Paige MD    The documentation recorded by the scribe accurately and completely reflects the service(s) I personally performed and the decisions made by me.     Juan Paige MD, MPH  Interventional Pulmonary and Critical Care  O: [phone]  5/26/2025